# Patient Record
Sex: FEMALE | Race: WHITE | NOT HISPANIC OR LATINO | Employment: UNEMPLOYED | URBAN - METROPOLITAN AREA
[De-identification: names, ages, dates, MRNs, and addresses within clinical notes are randomized per-mention and may not be internally consistent; named-entity substitution may affect disease eponyms.]

---

## 2017-02-13 ENCOUNTER — HOSPITAL ENCOUNTER (OUTPATIENT)
Dept: RADIOLOGY | Facility: CLINIC | Age: 47
Discharge: HOME | End: 2017-02-13
Payer: COMMERCIAL

## 2017-02-13 ENCOUNTER — ALLSCRIPTS OFFICE VISIT (OUTPATIENT)
Dept: OTHER | Facility: OTHER | Age: 47
End: 2017-02-13

## 2017-02-13 DIAGNOSIS — M25.572 PAIN IN LEFT ANKLE: ICD-10-CM

## 2017-02-13 PROCEDURE — 73610 X-RAY EXAM OF ANKLE: CPT

## 2017-10-08 ENCOUNTER — APPOINTMENT (EMERGENCY)
Dept: RADIOLOGY | Facility: HOSPITAL | Age: 47
End: 2017-10-08
Payer: COMMERCIAL

## 2017-10-08 ENCOUNTER — HOSPITAL ENCOUNTER (EMERGENCY)
Facility: HOSPITAL | Age: 47
Discharge: HOME | End: 2017-10-08
Attending: EMERGENCY MEDICINE | Admitting: EMERGENCY MEDICINE
Payer: COMMERCIAL

## 2017-10-08 VITALS
WEIGHT: 193.44 LBS | HEART RATE: 76 BPM | OXYGEN SATURATION: 100 % | BODY MASS INDEX: 32.23 KG/M2 | SYSTOLIC BLOOD PRESSURE: 114 MMHG | RESPIRATION RATE: 18 BRPM | TEMPERATURE: 97.9 F | DIASTOLIC BLOOD PRESSURE: 76 MMHG | HEIGHT: 65 IN

## 2017-10-08 DIAGNOSIS — S93.602A FOOT SPRAIN, LEFT, INITIAL ENCOUNTER: ICD-10-CM

## 2017-10-08 DIAGNOSIS — S93.402A SPRAIN OF LEFT ANKLE, UNSPECIFIED LIGAMENT, INITIAL ENCOUNTER: Primary | ICD-10-CM

## 2017-10-08 PROCEDURE — 73630 X-RAY EXAM OF FOOT: CPT

## 2017-10-08 PROCEDURE — 99283 EMERGENCY DEPT VISIT LOW MDM: CPT

## 2017-10-08 PROCEDURE — 73610 X-RAY EXAM OF ANKLE: CPT

## 2017-10-08 RX ORDER — AMPICILLIN TRIHYDRATE 250 MG
600 CAPSULE ORAL 2 TIMES DAILY
COMMUNITY

## 2017-10-08 RX ORDER — IBUPROFEN 400 MG/1
400 TABLET ORAL ONCE
Status: COMPLETED | OUTPATIENT
Start: 2017-10-08 | End: 2017-10-08

## 2017-10-08 RX ORDER — LEVETIRACETAM 500 MG/1
500 TABLET ORAL EVERY 12 HOURS SCHEDULED
COMMUNITY

## 2017-10-08 RX ADMIN — IBUPROFEN 400 MG: 400 TABLET, FILM COATED ORAL at 15:20

## 2017-10-08 NOTE — DISCHARGE INSTRUCTIONS
Ankle Sprain   WHAT YOU NEED TO KNOW:   An ankle sprain happens when 1 or more ligaments in your ankle joint stretch or tear  Ligaments are tough tissues that connect bones  Ligaments support your joints and keep your bones in place  DISCHARGE INSTRUCTIONS:   Return to the emergency department if:   · You have severe pain in your ankle  · Your foot or toes are cold or numb  · Your ankle becomes more weak or unstable (wobbly)  · You are unable to put any weight on your ankle or foot  · Your swelling has increased or returned  Contact your healthcare provider if:   · Your pain does not go away, even after treatment  · You have questions or concerns about your condition or care  Medicines: You may need any of the following:  · NSAIDs , such as ibuprofen, help decrease swelling, pain, and fever  This medicine is available with or without a doctor's order  NSAIDs can cause stomach bleeding or kidney problems in certain people  If you take blood thinner medicine, always ask your healthcare provider if NSAIDs are safe for you  Always read the medicine label and follow directions  · Acetaminophen  decreases pain  It is available without a doctor's order  Ask how much to take and how often to take it  Follow directions  Acetaminophen can cause liver damage if not taken correctly  · Prescription pain medicine  may be given  Ask how to take this medicine safely  · Take your medicine as directed  Contact your healthcare provider if you think your medicine is not helping or if you have side effects  Tell him or her if you are allergic to any medicine  Keep a list of the medicines, vitamins, and herbs you take  Include the amounts, and when and why you take them  Bring the list or the pill bottles to follow-up visits  Carry your medicine list with you in case of an emergency    Self care:   · Use support devices,  such as a brace, cast, or splint, may be needed to limit your movement and protect your joint  You may need to use crutches to decrease your pain as you move around  · Go to physical therapy as directed  A physical therapist teaches you exercises to help improve movement and strength, and to decrease pain  · Rest  your ankle so that it can heal  Return to normal activities as directed  · Apply ice on your ankle for 15 to 20 minutes every hour or as directed  Use an ice pack, or put crushed ice in a plastic bag  Cover it with a towel  Ice helps prevent tissue damage and decreases swelling and pain  · Compress  your ankle  Ask if you should wrap an elastic bandage around your injured ligament  An elastic bandage provides support and helps decrease swelling and movement so your joint can heal  Wear as long as directed  · Elevate  your ankle above the level of your heart as often as you can  This will help decrease swelling and pain  Prop your ankle on pillows or blankets to keep it elevated comfortably  Prevent another ankle sprain:   · Let your ankle heal   Find out how long your ligament needs to heal  Do not do any physical activity until your healthcare provider says it is okay  If you start activity too soon, you may develop a more serious injury  · Always warm up and stretch  before you exercise or play sports  · Use the right equipment  Always wear shoes that fit well and are made for the activity that you are doing  You may also need ankle supports, elbow and knee pads, or braces  Follow up with your healthcare provider as directed:  Write down your questions so you remember to ask them during your visits  © 2017 [employer] Information is for End User's use only and may not be sold, redistributed or otherwise used for commercial purposes  All illustrations and images included in CareNotes® are the copyrighted property of [employer]  or Sanya Chester  The above information is an  only   It is not intended as medical advice for individual conditions or treatments  Talk to your doctor, nurse or pharmacist before following any medical regimen to see if it is safe and effective for you  Foot Sprain   WHAT YOU NEED TO KNOW:   A foot sprain is caused by a stretched or torn ligament in the foot or toe  Ligaments are tough tissues that connect bones  DISCHARGE INSTRUCTIONS:   Seek care immediately if:   · You have numbness or tingling below the injury, such as in your toes  · The skin on your injured foot is blue or pale  · You have increased pain, even after you take pain medicine  Contact your healthcare provider if:   · You have new weakness in your foot  · You have new or increased swelling in your foot  · You have new or increased stiffness when you move your injured foot  · You have questions or concerns about your condition or care  Medicines:   · NSAIDs , such as ibuprofen, help decrease swelling, pain, and fever  This medicine is available with or without a doctor's order  NSAIDs can cause stomach bleeding or kidney problems in certain people  If you take blood thinner medicine, always ask if NSAIDs are safe for you  Always read the medicine label and follow directions  Do not give these medicines to children under 10months of age without direction from your child's healthcare provider  · Take your medicine as directed  Contact your healthcare provider if you think your medicine is not helping or if you have side effects  Tell him of her if you are allergic to any medicine  Keep a list of the medicines, vitamins, and herbs you take  Include the amounts, and when and why you take them  Bring the list or the pill bottles to follow-up visits  Carry your medicine list with you in case of an emergency  Self-care:   · Rest your foot  Limit movement in your sprained foot for the first 2 to 3 days  You might need crutches to take weight off your injured foot as it heals  Use crutches as directed  · Apply ice  on your foot for 15 to 20 minutes every hour or as directed  Use an ice pack, or put crushed ice in a plastic bag  Cover it with a towel  Ice helps prevent tissue damage and decreases swelling and pain  · Compress your foot  You may need to use tape or an elastic bandage to support your foot if you have a mild sprain  You may need a splint on your foot for support if your sprain is severe  Wear your splint for as many days as directed  · Elevate your foot  above the level of your heart as often as you can  This will help decrease swelling and pain  Prop your foot on pillows or blankets to keep it elevated comfortably  Exercise your foot:  You may be given exercises to improve your strength and to help decrease stiffness  The exercises and physical therapy can help restore strength and increase the range of motion in your foot  Ask your healthcare provider when you can return to your normal activities or play sports  Prevent another foot sprain:   · Warm up and stretch before you exercise  · Do not exercise when you feel pain or are tired  · Wear equipment to protect yourself when you play sports  Follow up with your healthcare provider as directed:  Write down your questions so you remember to ask them during your visits  © 2017 [employer] Information is for End User's use only and may not be sold, redistributed or otherwise used for commercial purposes  All illustrations and images included in CareNotes® are the copyrighted property of [employer]  or Sanya Chester  The above information is an  only  It is not intended as medical advice for individual conditions or treatments  Talk to your doctor, nurse or pharmacist before following any medical regimen to see if it is safe and effective for you

## 2017-10-08 NOTE — ED PROVIDER NOTES
History  Chief Complaint   Patient presents with    Ankle Injury     Patient presents to the ED for evaluation of an injury to her ankle occuring 30 minutes PTA when patient "rolled" her ankle adding that she was not wearing good shoes while working outside  History of injury at this site (sprains)  Patient is a 80-year-old female who was walking outside and rolled her left ankle on uneven ground just prior to arrival   She has no other injuries or complaints  She did not hit her head there was no loss consciousness  History provided by:  Patient  Ankle Injury   Location:  Left ankle  Severity:  Severe  Onset quality:  Sudden  Duration:  1 hour  Timing:  Constant  Progression:  Unchanged  Chronicity:  New  Worsened by:  Weight bearing  Associated symptoms: nausea    Associated symptoms: no abdominal pain, no chest pain, no congestion, no cough, no diarrhea, no ear pain, no fatigue, no fever, no headaches, no loss of consciousness, no myalgias, no rash, no rhinorrhea, no shortness of breath, no sore throat, no vomiting and no wheezing        Prior to Admission Medications   Prescriptions Last Dose Informant Patient Reported? Taking? Red Yeast Rice 600 MG CAPS   Yes Yes   Sig: Take 600 mg by mouth 2 (two) times a day   levETIRAcetam (KEPPRA) 500 mg tablet   Yes Yes   Sig: Take 500 mg by mouth every 12 (twelve) hours      Facility-Administered Medications: None       Past Medical History:   Diagnosis Date    Brain abscess     Hyperlipidemia     Peripheral neuropathy        Past Surgical History:   Procedure Laterality Date    BRAIN SURGERY         History reviewed  No pertinent family history  I have reviewed and agree with the history as documented  Social History   Substance Use Topics    Smoking status: Former Smoker    Smokeless tobacco: Never Used    Alcohol use Not on file        Review of Systems   Constitutional: Negative for fatigue and fever     HENT: Negative for congestion, ear pain, rhinorrhea and sore throat  Respiratory: Negative for cough, shortness of breath and wheezing  Cardiovascular: Negative for chest pain  Gastrointestinal: Positive for nausea  Negative for abdominal pain, diarrhea and vomiting  Musculoskeletal: Positive for joint swelling  Negative for myalgias  Skin: Negative for rash  Neurological: Negative for loss of consciousness and headaches  All other systems reviewed and are negative  Physical Exam  ED Triage Vitals [10/08/17 1520]   Temperature Pulse Respirations Blood Pressure SpO2   97 9 °F (36 6 °C) 76 18 114/76 100 %      Temp src Heart Rate Source Patient Position - Orthostatic VS BP Location FiO2 (%)   -- Monitor Sitting Left arm --      Pain Score       5           Physical Exam   Constitutional: She appears well-developed and well-nourished  HENT:   Head: Normocephalic and atraumatic  Cardiovascular: Normal rate  Pulmonary/Chest: Effort normal    Musculoskeletal:        Left ankle: She exhibits decreased range of motion and swelling  She exhibits no ecchymosis, no laceration and normal pulse  Tenderness  Lateral malleolus and head of 5th metatarsal tenderness found  Feet:    Moderate swelling noted to left lateral foot at the head of the 5th Mt  Minimal ttp on the lateral malleolus  NO TTP on medial malleolus  Skin: Skin is warm and dry  Psychiatric: She has a normal mood and affect  Nursing note and vitals reviewed  ED Medications  Medications   ibuprofen (MOTRIN) tablet 400 mg (400 mg Oral Given 10/8/17 1520)       Diagnostic Studies  Labs Reviewed - No data to display    XR foot 3+ views LEFT   Final Result      No acute osseous abnormality  Workstation performed: [id number]         XR ankle 3+ views LEFT   Final Result      Mild dorsal soft tissue swelling in the mid foot region  No acute osseous abnormality           Workstation performed: [id number]             Procedures  Procedures      Phone Contacts  ED Phone Contact    ED Course  ED Course                                MDM  Number of Diagnoses or Management Options  Foot sprain, left, initial encounter: minor  Sprain of left ankle, unspecified ligament, initial encounter: minor  Diagnosis management comments: 4:05 PM  Xrays negative for acute fracture  Will place in aircast, NWB on crutches and f/u with ortho  Usually follows with Dr Huan Lopez in the [place] office  Amount and/or Complexity of Data Reviewed  Tests in the radiology section of CPT®: ordered and reviewed  Independent visualization of images, tracings, or specimens: yes    Risk of Complications, Morbidity, and/or Mortality  Presenting problems: low  Diagnostic procedures: low  Management options: low    Patient Progress  Patient progress: stable    CritCare Time    Disposition  Final diagnoses:   Sprain of left ankle, unspecified ligament, initial encounter   Foot sprain, left, initial encounter     ED Disposition     ED Disposition Condition Comment    Discharge  [place] discharge to home/self care  Condition at discharge: Stable        Follow-up Information     Follow up With Specialties Details Why Contact Leonid Mantilla MD Orthopedic Surgery Schedule an appointment as soon as possible for a visit  [address]  [phone]          Patient's Medications   Discharge Prescriptions    No medications on file     No discharge procedures on file      ED Provider  Electronically Signed by       Alejandro Spangler DO  10/08/17 6475

## 2017-10-09 ENCOUNTER — ALLSCRIPTS OFFICE VISIT (OUTPATIENT)
Dept: OTHER | Facility: OTHER | Age: 47
End: 2017-10-09

## 2017-10-09 DIAGNOSIS — S93.492A SPRAIN OF OTHER LIGAMENT OF LEFT ANKLE, INITIAL ENCOUNTER: ICD-10-CM

## 2017-10-10 NOTE — PROGRESS NOTES
Assessment  1  Family history of arthritis (V17 7) (Z82 61) : Mother   2  Sprain of anterior talofibular ligament of left ankle, initial encounter (849 09) (O15 265N)    Plan  This appears to be a grade 2 ankle sprain of the left ankle  She will wean off of her brace and also will do physical therapy  She will also wean off of her crutches  If she fails to show significant improvement over the next 6 weeks, she can return for further evaluation  She has no pain further up the proximal fibula to palpation today  Discussion/Summary  The patient, patient's family was counseled regarding diagnostic results,-instructions for management,-prognosis,-patient and family education,-impressions,-risks and benefits of treatment options,-importance of compliance with treatment  Chief Complaint  1  Ankle Pain    History of Present Illness  Sola Lion is a 70-year-old female who [place] and inverted her left ankle on some uneven footing  She suffered an injury to that ankle and had significant swelling and pain  Emergency department took x-rays that demonstrate no obvious signs of fracture  She was placed into an Aircast stirrup  This has helped her with her pain  She was placed on crutches  She states that her pain is a sharp and moderate and constant discomfort that is worse with weight-bearing and better with rest   The pain radiates anterolaterally  Review of Systems    Constitutional: No fever, no chills, feels well, no tiredness, no recent weight gain or loss  Eyes: No complaints of eyesight problems, no red eyes  ENT: no loss of hearing, no nosebleeds, no sore throat  Cardiovascular: No complaints of chest pain, no palpitations, no leg claudication or lower extremity edema  Respiratory: no compliants of shortness of breath, no wheezing, no cough  Gastrointestinal: no complaints of abdominal pain, no constipation, no nausea or diarrhea, no vomiting, no bloody stools     Genitourinary: no complaints of dysuria, no incontinence  Musculoskeletal: as noted in HPI  Integumentary: no complaints of skin rash or lesion, no itching or dry skin, no skin wounds  Neurological: no complaints of headache, no confusion, no numbness or tingling, no dizziness  Endocrine: No complaints of muscle weakness, no feelings of weakness, no frequent urination, no excessive thirst    Psychiatric: No suicidal thoughts, no anxiety, no feelings of depression  Active Problems  1  Left ankle pain (719 47) (M25 572)    Past Medical History   · History of Left ankle pain (719 47) (M25 572)    The active problems and past medical history were reviewed and updated today  Surgical History    The surgical history was reviewed and updated today  Family History  Mother    · Family history of arthritis (V17 7) (Z82 61)   · Family history of malignant neoplasm (V16 9) (Z80 9)    The family history was reviewed and updated today  Social History   · Never a smoker  The social history was reviewed and updated today  Current Meds    The medication list was reviewed and updated today  Allergies  1  Sulfa Drugs    Vitals  Signs   Heart Rate: 62  Systolic: 619  Diastolic: 80  Height: 5 ft 5 in  Weight: 191 lb   BMI Calculated: 31 78  BSA Calculated: 1 94    Physical Exam    Left Ankle: Appearance: Normal except swelling laterally  Tenderness: None except the ATFL-and-CFL  ROM: painful limited ROM in all planes Dorsiflexion was 5/5  Plantar flexion was 5/5  Inversion was 5/5  Eversion was 3/5  Special Tests: negative Anterior Drawer Sign,-no pain with passive ER,-no peroneal tendon subluxation,-no posterior tibialis tendon subluxation-and-negative Squeeze Test    Constitutional - General appearance: Normal    Musculoskeletal - Gait and station: Abnormal  Gait evaluation demonstrated antalgia on the left  -Digits and nails: Normal -Muscle strength/tone: Normal -Lower extremity compartments: Normal  Cardiovascular - Pulses: Normal -Examination of extremities for edema and/or varicosities: Normal    Skin - Skin and subcutaneous tissue: Normal    Neurologic - Sensation: Normal -Lower extremity peripheral neuro exam: Normal    Psychiatric - Orientation to person, place, and time: Normal -Mood and affect: Normal    Eyes   Conjunctiva and lids: Normal     Pupils and irises: Normal        Results/Data  I personally reviewed the films/images/results in the office today  My interpretation follows  X-ray Review Negative left ankle x-rays        Signatures   Electronically signed by : JACKIE Green ; Oct  9 2017 12:33PM EST                       (Author)

## 2017-10-25 ENCOUNTER — APPOINTMENT (OUTPATIENT)
Dept: PHYSICAL THERAPY | Facility: CLINIC | Age: 47
End: 2017-10-25
Payer: COMMERCIAL

## 2017-10-25 DIAGNOSIS — S93.492A SPRAIN OF OTHER LIGAMENT OF LEFT ANKLE, INITIAL ENCOUNTER: ICD-10-CM

## 2017-10-25 PROCEDURE — G8979 MOBILITY GOAL STATUS: HCPCS

## 2017-10-25 PROCEDURE — G8978 MOBILITY CURRENT STATUS: HCPCS

## 2017-10-25 PROCEDURE — 97161 PT EVAL LOW COMPLEX 20 MIN: CPT

## 2017-10-27 ENCOUNTER — APPOINTMENT (OUTPATIENT)
Dept: PHYSICAL THERAPY | Facility: CLINIC | Age: 47
End: 2017-10-27
Payer: COMMERCIAL

## 2017-10-27 PROCEDURE — 97110 THERAPEUTIC EXERCISES: CPT

## 2017-10-27 PROCEDURE — 97140 MANUAL THERAPY 1/> REGIONS: CPT

## 2017-10-31 ENCOUNTER — APPOINTMENT (OUTPATIENT)
Dept: PHYSICAL THERAPY | Facility: CLINIC | Age: 47
End: 2017-10-31
Payer: COMMERCIAL

## 2017-10-31 PROCEDURE — 97140 MANUAL THERAPY 1/> REGIONS: CPT

## 2017-10-31 PROCEDURE — 97112 NEUROMUSCULAR REEDUCATION: CPT

## 2017-10-31 PROCEDURE — 97110 THERAPEUTIC EXERCISES: CPT

## 2017-11-01 ENCOUNTER — GENERIC CONVERSION - ENCOUNTER (OUTPATIENT)
Dept: OTHER | Facility: OTHER | Age: 47
End: 2017-11-01

## 2017-11-03 ENCOUNTER — APPOINTMENT (OUTPATIENT)
Dept: PHYSICAL THERAPY | Facility: CLINIC | Age: 47
End: 2017-11-03
Payer: COMMERCIAL

## 2017-11-03 PROCEDURE — 97140 MANUAL THERAPY 1/> REGIONS: CPT

## 2017-11-03 PROCEDURE — 97110 THERAPEUTIC EXERCISES: CPT

## 2017-11-08 ENCOUNTER — APPOINTMENT (OUTPATIENT)
Dept: PHYSICAL THERAPY | Facility: CLINIC | Age: 47
End: 2017-11-08
Payer: COMMERCIAL

## 2017-11-08 PROCEDURE — 97140 MANUAL THERAPY 1/> REGIONS: CPT

## 2017-11-08 PROCEDURE — 97110 THERAPEUTIC EXERCISES: CPT

## 2017-11-10 ENCOUNTER — APPOINTMENT (OUTPATIENT)
Dept: PHYSICAL THERAPY | Facility: CLINIC | Age: 47
End: 2017-11-10
Payer: COMMERCIAL

## 2017-11-10 PROCEDURE — 97110 THERAPEUTIC EXERCISES: CPT

## 2017-11-10 PROCEDURE — 97112 NEUROMUSCULAR REEDUCATION: CPT

## 2017-11-10 PROCEDURE — 97140 MANUAL THERAPY 1/> REGIONS: CPT

## 2017-11-15 ENCOUNTER — APPOINTMENT (OUTPATIENT)
Dept: PHYSICAL THERAPY | Facility: CLINIC | Age: 47
End: 2017-11-15
Payer: COMMERCIAL

## 2017-11-15 PROCEDURE — 97140 MANUAL THERAPY 1/> REGIONS: CPT

## 2017-11-15 PROCEDURE — 97110 THERAPEUTIC EXERCISES: CPT

## 2017-11-15 PROCEDURE — 97112 NEUROMUSCULAR REEDUCATION: CPT

## 2017-11-17 ENCOUNTER — APPOINTMENT (OUTPATIENT)
Dept: PHYSICAL THERAPY | Facility: CLINIC | Age: 47
End: 2017-11-17
Payer: COMMERCIAL

## 2017-11-17 PROCEDURE — 97110 THERAPEUTIC EXERCISES: CPT

## 2017-11-17 PROCEDURE — 97140 MANUAL THERAPY 1/> REGIONS: CPT

## 2017-11-17 PROCEDURE — 97112 NEUROMUSCULAR REEDUCATION: CPT

## 2017-11-22 ENCOUNTER — APPOINTMENT (OUTPATIENT)
Dept: PHYSICAL THERAPY | Facility: CLINIC | Age: 47
End: 2017-11-22
Payer: COMMERCIAL

## 2017-11-22 PROCEDURE — 97110 THERAPEUTIC EXERCISES: CPT

## 2017-11-22 PROCEDURE — 97112 NEUROMUSCULAR REEDUCATION: CPT

## 2017-11-29 ENCOUNTER — APPOINTMENT (OUTPATIENT)
Dept: PHYSICAL THERAPY | Facility: CLINIC | Age: 47
End: 2017-11-29
Payer: COMMERCIAL

## 2017-12-01 ENCOUNTER — GENERIC CONVERSION - ENCOUNTER (OUTPATIENT)
Dept: OBGYN CLINIC | Facility: CLINIC | Age: 47
End: 2017-12-01

## 2017-12-01 ENCOUNTER — APPOINTMENT (OUTPATIENT)
Dept: PHYSICAL THERAPY | Facility: CLINIC | Age: 47
End: 2017-12-01
Payer: COMMERCIAL

## 2017-12-01 PROCEDURE — G8979 MOBILITY GOAL STATUS: HCPCS

## 2017-12-01 PROCEDURE — G8978 MOBILITY CURRENT STATUS: HCPCS

## 2017-12-01 PROCEDURE — 97110 THERAPEUTIC EXERCISES: CPT

## 2017-12-04 ENCOUNTER — APPOINTMENT (OUTPATIENT)
Dept: PHYSICAL THERAPY | Facility: CLINIC | Age: 47
End: 2017-12-04
Payer: COMMERCIAL

## 2017-12-04 PROCEDURE — 97110 THERAPEUTIC EXERCISES: CPT

## 2017-12-04 PROCEDURE — 97112 NEUROMUSCULAR REEDUCATION: CPT

## 2017-12-06 ENCOUNTER — APPOINTMENT (OUTPATIENT)
Dept: PHYSICAL THERAPY | Facility: CLINIC | Age: 47
End: 2017-12-06
Payer: COMMERCIAL

## 2017-12-08 ENCOUNTER — APPOINTMENT (OUTPATIENT)
Dept: PHYSICAL THERAPY | Facility: CLINIC | Age: 47
End: 2017-12-08
Payer: COMMERCIAL

## 2017-12-08 PROCEDURE — 97110 THERAPEUTIC EXERCISES: CPT

## 2017-12-08 PROCEDURE — 97112 NEUROMUSCULAR REEDUCATION: CPT

## 2017-12-13 ENCOUNTER — APPOINTMENT (OUTPATIENT)
Dept: PHYSICAL THERAPY | Facility: CLINIC | Age: 47
End: 2017-12-13
Payer: COMMERCIAL

## 2017-12-13 PROCEDURE — 97110 THERAPEUTIC EXERCISES: CPT

## 2017-12-13 PROCEDURE — 97112 NEUROMUSCULAR REEDUCATION: CPT

## 2017-12-15 ENCOUNTER — APPOINTMENT (OUTPATIENT)
Dept: PHYSICAL THERAPY | Facility: CLINIC | Age: 47
End: 2017-12-15
Payer: COMMERCIAL

## 2017-12-15 PROCEDURE — 97110 THERAPEUTIC EXERCISES: CPT

## 2017-12-15 PROCEDURE — 97112 NEUROMUSCULAR REEDUCATION: CPT

## 2018-01-13 VITALS
HEART RATE: 73 BPM | SYSTOLIC BLOOD PRESSURE: 125 MMHG | DIASTOLIC BLOOD PRESSURE: 91 MMHG | HEIGHT: 65 IN | WEIGHT: 138 LBS | BODY MASS INDEX: 22.99 KG/M2

## 2018-01-15 VITALS
SYSTOLIC BLOOD PRESSURE: 119 MMHG | WEIGHT: 191 LBS | DIASTOLIC BLOOD PRESSURE: 80 MMHG | HEART RATE: 62 BPM | HEIGHT: 65 IN | BODY MASS INDEX: 31.82 KG/M2

## 2024-02-06 ENCOUNTER — APPOINTMENT (EMERGENCY)
Dept: CT IMAGING | Facility: HOSPITAL | Age: 54
End: 2024-02-06
Payer: COMMERCIAL

## 2024-02-06 ENCOUNTER — APPOINTMENT (EMERGENCY)
Dept: RADIOLOGY | Facility: HOSPITAL | Age: 54
End: 2024-02-06
Payer: COMMERCIAL

## 2024-02-06 ENCOUNTER — HOSPITAL ENCOUNTER (EMERGENCY)
Facility: HOSPITAL | Age: 54
Discharge: HOME | End: 2024-02-06
Attending: EMERGENCY MEDICINE
Payer: COMMERCIAL

## 2024-02-06 VITALS
DIASTOLIC BLOOD PRESSURE: 57 MMHG | TEMPERATURE: 97.9 F | SYSTOLIC BLOOD PRESSURE: 112 MMHG | RESPIRATION RATE: 20 BRPM | OXYGEN SATURATION: 100 % | HEART RATE: 87 BPM

## 2024-02-06 DIAGNOSIS — R55 SYNCOPE: ICD-10-CM

## 2024-02-06 DIAGNOSIS — K52.9 GASTROENTERITIS: Primary | ICD-10-CM

## 2024-02-06 LAB
ALBUMIN SERPL BCP-MCNC: 4.3 G/DL (ref 3.5–5)
ALP SERPL-CCNC: 64 U/L (ref 34–104)
ALT SERPL W P-5'-P-CCNC: 9 U/L (ref 7–52)
ANION GAP SERPL CALCULATED.3IONS-SCNC: 9 MMOL/L
ANISOCYTOSIS BLD QL SMEAR: PRESENT
AST SERPL W P-5'-P-CCNC: 13 U/L (ref 13–39)
BASOPHILS # BLD MANUAL: 0 THOUSAND/UL (ref 0–0.1)
BASOPHILS NFR MAR MANUAL: 0 % (ref 0–1)
BILIRUB SERPL-MCNC: 1.37 MG/DL (ref 0.2–1)
BUN SERPL-MCNC: 18 MG/DL (ref 5–25)
CALCIUM SERPL-MCNC: 8.9 MG/DL (ref 8.4–10.2)
CARDIAC TROPONIN I PNL SERPL HS: <2 NG/L
CARDIAC TROPONIN I PNL SERPL HS: <2 NG/L
CHLORIDE SERPL-SCNC: 101 MMOL/L (ref 96–108)
CO2 SERPL-SCNC: 27 MMOL/L (ref 21–32)
CREAT SERPL-MCNC: 0.92 MG/DL (ref 0.6–1.3)
EOSINOPHIL # BLD MANUAL: 0 THOUSAND/UL (ref 0–0.4)
EOSINOPHIL NFR BLD MANUAL: 0 % (ref 0–6)
ERYTHROCYTE [DISTWIDTH] IN BLOOD BY AUTOMATED COUNT: 12.5 % (ref 11.6–15.1)
FLUAV RNA RESP QL NAA+PROBE: NEGATIVE
FLUBV RNA RESP QL NAA+PROBE: NEGATIVE
GFR SERPL CREATININE-BSD FRML MDRD: 71 ML/MIN/1.73SQ M
GLUCOSE SERPL-MCNC: 138 MG/DL (ref 65–140)
HCT VFR BLD AUTO: 44.8 % (ref 34.8–46.1)
HGB BLD-MCNC: 14.9 G/DL (ref 11.5–15.4)
LIPASE SERPL-CCNC: 18 U/L (ref 11–82)
LYMPHOCYTES # BLD AUTO: 0.39 THOUSAND/UL (ref 0.6–4.47)
LYMPHOCYTES # BLD AUTO: 5 % (ref 14–44)
MCH RBC QN AUTO: 28.4 PG (ref 26.8–34.3)
MCHC RBC AUTO-ENTMCNC: 33.3 G/DL (ref 31.4–37.4)
MCV RBC AUTO: 85 FL (ref 82–98)
MONOCYTES # BLD AUTO: 0 THOUSAND/UL (ref 0–1.22)
MONOCYTES NFR BLD: 0 % (ref 4–12)
NEUTROPHILS # BLD MANUAL: 7.32 THOUSAND/UL (ref 1.85–7.62)
NEUTS SEG NFR BLD AUTO: 95 % (ref 43–75)
PLATELET # BLD AUTO: 178 THOUSANDS/UL (ref 149–390)
PLATELET BLD QL SMEAR: ADEQUATE
PMV BLD AUTO: 10.2 FL (ref 8.9–12.7)
POIKILOCYTOSIS BLD QL SMEAR: PRESENT
POTASSIUM SERPL-SCNC: 4.1 MMOL/L (ref 3.5–5.3)
PROT SERPL-MCNC: 6.5 G/DL (ref 6.4–8.4)
RBC # BLD AUTO: 5.25 MILLION/UL (ref 3.81–5.12)
RBC MORPH BLD: PRESENT
RSV RNA RESP QL NAA+PROBE: NEGATIVE
SARS-COV-2 RNA RESP QL NAA+PROBE: NEGATIVE
SODIUM SERPL-SCNC: 137 MMOL/L (ref 135–147)
TSH SERPL DL<=0.05 MIU/L-ACNC: 1.02 UIU/ML (ref 0.45–4.5)
WBC # BLD AUTO: 7.7 THOUSAND/UL (ref 4.31–10.16)

## 2024-02-06 PROCEDURE — 84484 ASSAY OF TROPONIN QUANT: CPT

## 2024-02-06 PROCEDURE — 96366 THER/PROPH/DIAG IV INF ADDON: CPT

## 2024-02-06 PROCEDURE — 85027 COMPLETE CBC AUTOMATED: CPT

## 2024-02-06 PROCEDURE — 71045 X-RAY EXAM CHEST 1 VIEW: CPT

## 2024-02-06 PROCEDURE — 80053 COMPREHEN METABOLIC PANEL: CPT

## 2024-02-06 PROCEDURE — 99284 EMERGENCY DEPT VISIT MOD MDM: CPT

## 2024-02-06 PROCEDURE — 70450 CT HEAD/BRAIN W/O DYE: CPT

## 2024-02-06 PROCEDURE — 99285 EMERGENCY DEPT VISIT HI MDM: CPT | Performed by: EMERGENCY MEDICINE

## 2024-02-06 PROCEDURE — 85007 BL SMEAR W/DIFF WBC COUNT: CPT

## 2024-02-06 PROCEDURE — 96375 TX/PRO/DX INJ NEW DRUG ADDON: CPT

## 2024-02-06 PROCEDURE — 36415 COLL VENOUS BLD VENIPUNCTURE: CPT

## 2024-02-06 PROCEDURE — 0241U HB NFCT DS VIR RESP RNA 4 TRGT: CPT

## 2024-02-06 PROCEDURE — 93005 ELECTROCARDIOGRAM TRACING: CPT

## 2024-02-06 PROCEDURE — 96365 THER/PROPH/DIAG IV INF INIT: CPT

## 2024-02-06 PROCEDURE — 84443 ASSAY THYROID STIM HORMONE: CPT

## 2024-02-06 PROCEDURE — 83690 ASSAY OF LIPASE: CPT

## 2024-02-06 PROCEDURE — G1004 CDSM NDSC: HCPCS

## 2024-02-06 RX ORDER — KETOROLAC TROMETHAMINE 30 MG/ML
15 INJECTION, SOLUTION INTRAMUSCULAR; INTRAVENOUS ONCE
Status: COMPLETED | OUTPATIENT
Start: 2024-02-06 | End: 2024-02-06

## 2024-02-06 RX ORDER — ROSUVASTATIN CALCIUM 5 MG/1
5 TABLET, COATED ORAL DAILY
COMMUNITY

## 2024-02-06 RX ORDER — ONDANSETRON 4 MG/1
4 TABLET, FILM COATED ORAL EVERY 6 HOURS
Qty: 12 TABLET | Refills: 0 | Status: SHIPPED | OUTPATIENT
Start: 2024-02-06

## 2024-02-06 RX ORDER — ONDANSETRON 2 MG/ML
4 INJECTION INTRAMUSCULAR; INTRAVENOUS ONCE
Status: COMPLETED | OUTPATIENT
Start: 2024-02-06 | End: 2024-02-06

## 2024-02-06 RX ADMIN — KETOROLAC TROMETHAMINE 15 MG: 30 INJECTION, SOLUTION INTRAMUSCULAR; INTRAVENOUS at 04:12

## 2024-02-06 RX ADMIN — ONDANSETRON 4 MG: 2 INJECTION INTRAMUSCULAR; INTRAVENOUS at 04:12

## 2024-02-06 RX ADMIN — SODIUM CHLORIDE, SODIUM LACTATE, POTASSIUM CHLORIDE, AND CALCIUM CHLORIDE 1000 ML: .6; .31; .03; .02 INJECTION, SOLUTION INTRAVENOUS at 01:45

## 2024-02-06 NOTE — ED PROVIDER NOTES
History  Chief Complaint   Patient presents with    Vomiting     Nausea/vomiting/fever. Was on the toilet and synopsized, fell onto floor and hit her head. She is not on any blood thinners.      Rachel is a 54 yof who presents after syncopal episode with headstrike.  Two days ago, had onset of nasal congestion but without accompanying symptoms.  Felt nauseated on waking this morning, developed fever, chills, myalgias, and had numerous episodes of non-bloody, non-bilious vomiting this evening, accompanied by occasional watery, loose, non-bloody stools.  Was on the toilet this evening having a large bowel movement, also felt as though she was going to vomit at that time, felt lightheaded and though she was going to pass out but denies chest pain, palpitations, or dyspnea at that time.  Had syncopal episode, struck head on bathroom floor, LOC only lasted several seconds and she quickly returned to baseline per . Has mild occipital headache   Denies vision changes, numbness, weakness, chest pain, palpitations, dyspnea, abdominal pain, peripheral edema, or unilateral calf pain or swelling.  Feels mildly generally weak but denies focal weakness, also feels lightheadedness has completely resolved.  No known travel or sick contacts.        Prior to Admission Medications   Prescriptions Last Dose Informant Patient Reported? Taking?   Red Yeast Rice 600 MG CAPS   Yes No   Sig: Take 600 mg by mouth 2 (two) times a day   levETIRAcetam (KEPPRA) 500 mg tablet   Yes No   Sig: Take 500 mg by mouth every 12 (twelve) hours   rosuvastatin (CRESTOR) 5 mg tablet   Yes Yes   Sig: Take 5 mg by mouth daily      Facility-Administered Medications: None       Past Medical History:   Diagnosis Date    Brain abscess     Hyperlipidemia     Peripheral neuropathy        Past Surgical History:   Procedure Laterality Date    BRAIN SURGERY         History reviewed. No pertinent family history.  I have reviewed and agree with the history as  documented.    E-Cigarette/Vaping     E-Cigarette/Vaping Substances     Social History     Tobacco Use    Smoking status: Former    Smokeless tobacco: Never   Substance Use Topics    Drug use: No        Review of Systems   Constitutional:  Positive for chills, fatigue and fever. Negative for diaphoresis.   HENT:  Positive for congestion. Negative for rhinorrhea, sneezing, sore throat and trouble swallowing.    Eyes: Negative.  Negative for visual disturbance.   Respiratory: Negative.  Negative for cough and shortness of breath.    Cardiovascular: Negative.  Negative for chest pain, palpitations and leg swelling.   Gastrointestinal:  Positive for diarrhea, nausea and vomiting. Negative for abdominal distention, abdominal pain and blood in stool.   Endocrine: Negative.    Genitourinary: Negative.  Negative for difficulty urinating, dysuria, flank pain and hematuria.   Musculoskeletal:  Negative for back pain and neck pain.   Skin: Negative.  Negative for wound.   Allergic/Immunologic: Negative.    Neurological:  Positive for syncope and weakness. Negative for dizziness, light-headedness, numbness and headaches.   Hematological: Negative.  Does not bruise/bleed easily.   Psychiatric/Behavioral: Negative.  Negative for confusion.    All other systems reviewed and are negative.      Physical Exam  ED Triage Vitals [02/06/24 0113]   Temperature Pulse Respirations Blood Pressure SpO2   97.9 °F (36.6 °C) 87 20 112/57 100 %      Temp Source Heart Rate Source Patient Position - Orthostatic VS BP Location FiO2 (%)   Oral Monitor Sitting Right arm --      Pain Score       --             Orthostatic Vital Signs  Vitals:    02/06/24 0113   BP: 112/57   Pulse: 87   Patient Position - Orthostatic VS: Sitting       Physical Exam  Vitals and nursing note reviewed. Exam conducted with a chaperone present.   Constitutional:       General: She is not in acute distress.     Appearance: Normal appearance. She is not diaphoretic.   HENT:       Head: Normocephalic and atraumatic.      Right Ear: Tympanic membrane and external ear normal.      Left Ear: Tympanic membrane and external ear normal.      Nose: Congestion present. No rhinorrhea.      Mouth/Throat:      Mouth: Mucous membranes are moist.      Pharynx: Oropharynx is clear. No oropharyngeal exudate or posterior oropharyngeal erythema.   Eyes:      General: No scleral icterus.        Right eye: No discharge.         Left eye: No discharge.      Extraocular Movements: Extraocular movements intact.      Conjunctiva/sclera: Conjunctivae normal.      Pupils: Pupils are equal, round, and reactive to light.   Cardiovascular:      Rate and Rhythm: Normal rate and regular rhythm.      Pulses: Normal pulses.      Heart sounds: Normal heart sounds.   Pulmonary:      Effort: Pulmonary effort is normal. No respiratory distress.      Breath sounds: Normal breath sounds. No wheezing or rales.   Chest:      Chest wall: No tenderness.   Abdominal:      General: Abdomen is flat. Bowel sounds are normal. There is no distension.      Palpations: Abdomen is soft.      Tenderness: There is no abdominal tenderness. There is no right CVA tenderness, left CVA tenderness, guarding or rebound.   Musculoskeletal:         General: No swelling or tenderness. Normal range of motion.      Cervical back: Normal range of motion and neck supple. No tenderness.      Right lower leg: No edema.      Left lower leg: No edema.   Lymphadenopathy:      Cervical: No cervical adenopathy.   Skin:     General: Skin is warm and dry.      Capillary Refill: Capillary refill takes less than 2 seconds.      Coloration: Skin is pale.      Findings: No bruising or rash.   Neurological:      General: No focal deficit present.      Mental Status: She is alert and oriented to person, place, and time.      Sensory: No sensory deficit.      Motor: No weakness.   Psychiatric:         Mood and Affect: Mood normal.         Behavior: Behavior normal.          ED Medications  Medications   lactated ringers bolus 1,000 mL (0 mL Intravenous Stopped 2/6/24 0412)   ketorolac (TORADOL) injection 15 mg (15 mg Intravenous Given 2/6/24 0412)   ondansetron (ZOFRAN) injection 4 mg (4 mg Intravenous Given 2/6/24 0412)       Diagnostic Studies  Results Reviewed       Procedure Component Value Units Date/Time    HS Troponin I 2hr [[id number]] Collected: 02/06/24 0504    Lab Status: Final result Specimen: Blood from Arm, Left Updated: 02/06/24 0539     hs TnI 2hr <2 ng/L      Delta 2hr hsTnI --    RBC Morphology Reflex Test [[id number]] Collected: 02/06/24 0144    Lab Status: Final result Specimen: Blood from Arm, Left Updated: 02/06/24 0301    CBC and differential [[id number]]  (Abnormal) Collected: 02/06/24 0144    Lab Status: Final result Specimen: Blood from Arm, Left Updated: 02/06/24 0241     WBC 7.70 Thousand/uL      RBC 5.25 Million/uL      Hemoglobin 14.9 g/dL      Hematocrit 44.8 %      MCV 85 fL      MCH 28.4 pg      MCHC 33.3 g/dL      RDW 12.5 %      MPV 10.2 fL      Platelets 178 Thousands/uL     Narrative:      This is an appended report.  These results have been appended to a previously verified report.    Manual Differential(PHLEBS Do Not Order) [[id number]]  (Abnormal) Collected: 02/06/24 0144    Lab Status: Final result Specimen: Blood from Arm, Left Updated: 02/06/24 0241     Segmented % 95 %      Lymphocytes % 5 %      Monocytes % 0 %      Eosinophils, % 0 %      Basophils % 0 %      Absolute Neutrophils 7.32 Thousand/uL      Lymphocytes Absolute 0.39 Thousand/uL      Monocytes Absolute 0.00 Thousand/uL      Eosinophils Absolute 0.00 Thousand/uL      Basophils Absolute 0.00 Thousand/uL      Total Counted --     RBC Morphology Present     Platelet Estimate Adequate     Anisocytosis Present     Poikilocytes Present    FLU/RSV/COVID - if FLU/RSV clinically relevant [[id number]]  (Normal) Collected: 02/06/24 0144    Lab Status: Final result Specimen: Nares from Nose  Updated: 02/06/24 0229     SARS-CoV-2 Negative     INFLUENZA A PCR Negative     INFLUENZA B PCR Negative     RSV PCR Negative    Narrative:      FOR PEDIATRIC PATIENTS - copy/paste COVID Guidelines URL to browser: [email or web address]    SARS-CoV-2 assay is a Nucleic Acid Amplification assay intended for the  qualitative detection of nucleic acid from SARS-CoV-2 in nasopharyngeal  swabs. Results are for the presumptive identification of SARS-CoV-2 RNA.    Positive results are indicative of infection with SARS-CoV-2, the virus  causing COVID-19, but do not rule out bacterial infection or co-infection  with other viruses. Laboratories within the [place] and its  territories are required to report all positive results to the appropriate  public health authorities. Negative results do not preclude SARS-CoV-2  infection and should not be used as the sole basis for treatment or other  patient management decisions. Negative results must be combined with  clinical observations, patient history, and epidemiological information.  This test has not been FDA cleared or approved.    This test has been authorized by FDA under an Emergency Use Authorization  (EUA). This test is only authorized for the duration of time the  declaration that circumstances exist justifying the authorization of the  emergency use of an in vitro diagnostic tests for detection of SARS-CoV-2  virus and/or diagnosis of COVID-19 infection under section 564(b)(1) of  the Act, 21 U.S.C. 360bbb-3(b)(1), unless the authorization is terminated  or revoked sooner. The test has been validated but independent review by FDA  and CLIA is pending.    Test performed using [employer]: This RT-PCR assay targets N2,  a region unique to SARS-CoV-2. A conserved region in the E-gene was chosen  for pan-Sarbecovirus detection which includes SARS-CoV-2.    According to CMS-2020-01-R, this platform meets the  definition of high-throughput technology.    TSH, 3rd generation with Free T4 reflex [[id number]]  (Normal) Collected: 02/06/24 0144    Lab Status: Final result Specimen: Blood from Arm, Left Updated: 02/06/24 0224     TSH 3RD GENERATON 1.023 uIU/mL     HS Troponin 0hr (reflex protocol) [[id number]]  (Normal) Collected: 02/06/24 0144    Lab Status: Final result Specimen: Blood from Arm, Left Updated: 02/06/24 0216     hs TnI 0hr <2 ng/L     Comprehensive metabolic panel [[id number]]  (Abnormal) Collected: 02/06/24 0144    Lab Status: Final result Specimen: Blood from Arm, Left Updated: 02/06/24 0213     Sodium 137 mmol/L      Potassium 4.1 mmol/L      Chloride 101 mmol/L      CO2 27 mmol/L      ANION GAP 9 mmol/L      BUN 18 mg/dL      Creatinine 0.92 mg/dL      Glucose 138 mg/dL      Calcium 8.9 mg/dL      AST 13 U/L      ALT 9 U/L      Alkaline Phosphatase 64 U/L      Total Protein 6.5 g/dL      Albumin 4.3 g/dL      Total Bilirubin 1.37 mg/dL      eGFR 71 ml/min/1.73sq m     Narrative:      National Kidney Disease Foundation guidelines for Chronic Kidney Disease (CKD):     Stage 1 with normal or high GFR (GFR > 90 mL/min/1.73 square meters)    Stage 2 Mild CKD (GFR = 60-89 mL/min/1.73 square meters)    Stage 3A Moderate CKD (GFR = 45-59 mL/min/1.73 square meters)    Stage 3B Moderate CKD (GFR = 30-44 mL/min/1.73 square meters)    Stage 4 Severe CKD (GFR = 15-29 mL/min/1.73 square meters)    Stage 5 End Stage CKD (GFR <15 mL/min/1.73 square meters)  Note: GFR calculation is accurate only with a steady state creatinine    Lipase [[id number]]  (Normal) Collected: 02/06/24 0144    Lab Status: Final result Specimen: Blood from Arm, Left Updated: 02/06/24 0213     Lipase 18 u/L                    XR chest 1 view portable   ED Interpretation by Krissy Stanley DO (02/06 0682)   No obvious acute cardiopulmonary findings as per my independent interpretation.      Final Result by Venkatesh Munoz DO (02/06 9623)       No acute cardiopulmonary disease.               Resident: DOMINGA HEDRICK I, the attending radiologist, have reviewed the images and agree with the final report above.      Workstation performed: [id number]         CT head without contrast   Final Result by Evgeny Cortez DO (02/06 0250)      No acute intracranial abnormality.                  Workstation performed: [id number]               Procedures  ECG 12 Lead Documentation Only    Date/Time: 2/6/2024 2:37 AM    Performed by: Krissy Stanley DO  Authorized by: Krissy Stanley DO    Indications / Diagnosis:  Syncope  Patient location:  ED  Previous ECG:     Previous ECG:  Unavailable  Interpretation:     Interpretation: normal    Rate:     ECG rate:  86    ECG rate assessment: normal    Rhythm:     Rhythm: sinus rhythm    Ectopy:     Ectopy: none    QRS:     QRS axis:  Normal    QRS intervals:  Normal  Conduction:     Conduction: normal    ST segments:     ST segments:  Normal  T waves:     T waves: normal          ED Course  ED Course as of 02/10/24 2335   Tue Feb 06, 2024   0155 CBC and differential(!)  Hemoglobin and RBCs increased from prior, likely 2/2 dehydration.   0328 CT head without contrast  IMPRESSION:     No acute intracranial abnormality.     0342 Comprehensive metabolic panel(!)  Normal with exception to mild increased bilirubin.   0343 Lipase: 18  Normal, doubt pancreatitis.   0343 hs TnI 0hr: <2  Will obtain 2 hour repeat.  Heart score: 0   0343 FLU/RSV/COVID - if FLU/RSV clinically relevant  Negative.   0412 CT head without contrast   0412 XR chest 1 view portable  No obvious acute cardiopulmonary findings as per my independent interpretation.   0431 No further episodes of vomiting.  HA improved with toradol.   Will attempt PO challenge.  Pending 2 hour repeat trop.     0612 hs TnI 2hr: <2  Normal.   0616 Patient feeling much improved after medications and IV fluids, is tolerating PO. After discussion, will discharge  with rx for zofran, close PCP f/u.             HEART Risk Score      Flowsheet Row Most Recent Value   Heart Score Risk Calculator    History 0 Filed at: 02/06/2024 0344   ECG 0 Filed at: 02/06/2024 0344   Age 1 Filed at: 02/06/2024 0344   Risk Factors 1 Filed at: 02/06/2024 0344   Troponin 0 Filed at: 02/06/2024 0344   HEART Score 2 Filed at: 02/06/2024 0344                        SBIRT 22yo+      Flowsheet Row Most Recent Value   Initial Alcohol Screen: US AUDIT-C     1. How often do you have a drink containing alcohol? 0 Filed at: 02/06/2024 0112   2. How many drinks containing alcohol do you have on a typical day you are drinking?  0 Filed at: 02/06/2024 0112   3b. FEMALE Any Age, or MALE 65+: How often do you have 4 or more drinks on one occassion? 0 Filed at: 02/06/2024 0112   Audit-C Score 0 Filed at: 02/06/2024 0112   AMAIRANI: How many times in the past year have you...    Used an illegal drug or used a prescription medication for non-medical reasons? Never Filed at: 02/06/2024 0112                  Medical Decision Making  Pt presents after syncopal episode precipitated by episode of vomiting and profuse watery diarrhea.  Struck head, denies any injuries from fall.    VS WNL, physical exam as above.  Likely gastroenteritis with vasovagal syncope, however will obtain labs to evaluate for anemia, electrolyte abnormality, ROSS, elevated troponin, arrhythmia.  Will also obtain head CT given head strike and continued vomiting, however this is more likely 2/2 her viral illness.  No abdominal pain to suggest significant intra-abdominal infection.  No dysuria to suggest UTI.  See ED course for remainder of MDM.    Problems Addressed:  Gastroenteritis: acute illness or injury  Syncope: acute illness or injury    Amount and/or Complexity of Data Reviewed  Labs: ordered. Decision-making details documented in ED Course.  Radiology: ordered and independent interpretation performed. Decision-making details documented in ED  Course.  ECG/medicine tests: ordered and independent interpretation performed.    Risk  Prescription drug management.          Disposition  Final diagnoses:   Gastroenteritis   Syncope     Time reflects when diagnosis was documented in both MDM as applicable and the Disposition within this note       Time User Action Codes Description Comment    2/6/2024  6:17 AM Krissy Stanley [K52.9] Gastroenteritis     2/6/2024  6:17 AM Krissy Stanley [[id number]] Syncope           ED Disposition       ED Disposition   Discharge    Condition   Stable    Date/Time   Tue Feb 6, 2024 0618    Comment   Rachel Larson discharge to home/self care.                   Follow-up Information       Follow up With Specialties Details Why Contact Info    Miguelangel Baron MD Family Medicine   [address]  [place] [id number]  [phone]              Discharge Medication List as of 2/6/2024  6:18 AM        START taking these medications    Details   ondansetron (ZOFRAN) 4 mg tablet Take 1 tablet (4 mg total) by mouth every 6 (six) hours, Starting Tue 2/6/2024, Normal           CONTINUE these medications which have NOT CHANGED    Details   rosuvastatin (CRESTOR) 5 mg tablet Take 5 mg by mouth daily, Historical Med      levETIRAcetam (KEPPRA) 500 mg tablet Take 500 mg by mouth every 12 (twelve) hours, Historical Med      Red Yeast Rice 600 MG CAPS Take 600 mg by mouth 2 (two) times a day, Historical Med           No discharge procedures on file.    PDMP Review       None             ED Provider  Attending physically available and evaluated Rachel Larson. I managed the patient along with the ED Attending.    Electronically Signed by           Krissy Stanley DO  02/11/24 0025

## 2024-02-08 LAB
ATRIAL RATE: 86 BPM
P AXIS: 45 DEGREES
PR INTERVAL: 138 MS
QRS AXIS: -16 DEGREES
QRSD INTERVAL: 80 MS
QT INTERVAL: 342 MS
QTC INTERVAL: 409 MS
T WAVE AXIS: 44 DEGREES
VENTRICULAR RATE: 86 BPM

## 2024-02-08 PROCEDURE — 93010 ELECTROCARDIOGRAM REPORT: CPT | Performed by: INTERNAL MEDICINE

## 2024-02-08 NOTE — ED ATTENDING ATTESTATION
2/6/2024  I, Celestina Caban MD, saw and evaluated the patient. I have discussed the patient with the resident/non-physician practitioner and agree with the resident's/non-physician practitioner's findings, Plan of Care, and MDM as documented in the resident's/non-physician practitioner's note, except where noted. All available labs and Radiology studies were reviewed.  I was present for key portions of any procedure(s) performed by the resident/non-physician practitioner and I was immediately available to provide assistance.       At this point I agree with the current assessment done in the Emergency Department.  I have conducted an independent evaluation of this patient a history and physical is as follows:    Patient is a 53-year-old female who presents to the emergency department following episode of syncope which occurred as she was in the process of passing watery diarrhea.  She fell forward striking her head and had brief loss of consciousness.  Several episodes of emesis followed.  Nausea is much improved at time of my assessment.  A couple of days ago she started with nasal congestion.  Earlier in the day today she appreciated fevers, chills, nausea and vomiting.  She did vomit the majority of a chicken sandwich which she questions the integrity of although she is already had some nausea and vomiting preceding this ingestion.  Vision was dim around time of syncope.  No diplopia or alternate vision abnormalities.  No weakness or paresthesias focally.    On exam she appears mildly malaised.  Mucous membranes are moist.  Heart sounds regular and lungs clear to auscultation bilaterally.  Abdomen is soft and nontender.  No cervical tenderness.  Clear speech. No facial asymmetry/ deficit appreciated.  Pupils briskly reactive to light.  EOMI.  No nystagmus. Strong eye closure & symmetric brow raise. Ability to insufflate cheeks, protrude tongue midline & range this laterally. Symmetric/ intact  sensation in the upper, mid & lower portions of the face.  5/5 strength on head turn, shoulder shrug, bicep, tricep & deltoid movement against resistance b/l.  5/5 strength on b/l hand , pincer grasp, finger abduction, dorsi & volar flexion, hip flexion, dorsi & plantar flexion. Symmetric grossly intact sensation in the UE & LE.  No dysmetria.     CT head was performed in consideration of possible ICH given repeated emesis following head impact.  Fortunately none visualized.  Discussed supportive care including staying well-hydrated and use of as needed antiemetic in setting of suspected viral gastroenteritis.  Current head pain may be representative of mild concussion.  PCP follow-up if headache persists.  Plan for p.o. challenge/reassessment prior to discharge.    ED Course         Critical Care Time  Procedures

## 2024-02-16 ENCOUNTER — OFFICE VISIT (OUTPATIENT)
Age: 54
End: 2024-02-16
Payer: COMMERCIAL

## 2024-02-16 VITALS — WEIGHT: 191 LBS | BODY MASS INDEX: 31.82 KG/M2 | HEIGHT: 65 IN

## 2024-02-16 DIAGNOSIS — M54.50 ACUTE BILATERAL LOW BACK PAIN WITHOUT SCIATICA: Primary | ICD-10-CM

## 2024-02-16 DIAGNOSIS — M79.18 MYOFASCIAL PAIN: ICD-10-CM

## 2024-02-16 DIAGNOSIS — M54.6 ACUTE BILATERAL THORACIC BACK PAIN: ICD-10-CM

## 2024-02-16 PROCEDURE — 98941 CHIROPRACT MANJ 3-4 REGIONS: CPT | Performed by: CHIROPRACTOR

## 2024-02-16 PROCEDURE — 99203 OFFICE O/P NEW LOW 30 MIN: CPT | Performed by: CHIROPRACTOR

## 2024-02-16 NOTE — PROGRESS NOTES
HPI:  Back Pain  This is a new problem. The current episode started more than 1 month ago. The problem occurs intermittently. The problem has been waxing and waning since onset. The pain is present in the lumbar spine, sacro-iliac and thoracic spine. The quality of the pain is described as aching. The pain does not radiate. The pain is at a severity of 7/10. The pain is moderate. The symptoms are aggravated by bending, standing and twisting. She has tried chiropractic manipulation and heat for the symptoms. The treatment provided moderate relief.     Rachel Larson is a 54 y.o. female who presents for evaluation and possible treatment of relatively recent onset low back pain radiating superior toward into the mid thoracic spine.  She relates no significant trauma however was bending over and doing some lifting when she felt insidious onset of back pain.  She denies any lower extremity radiation of symptoms denies any numbness or tingling, denies any weakness, reports no associated bowel bladder issues.    Pain is exacerbated by arising out of a seated position bending and twisting alleviated by stretching.  She has not utilized any other means of ice or heat.    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    She relates no regular exercise program but notes that she is extremely active on a 7-day a week basis.  She does have a past history for similar incidences which occur from time to time and have been amenable to chiropractic intervention.      Chief Complaint   Patient presents with   • Back Pain     Lower lumbar pain that radiates up to mid back . Patient states sharp pain that is constant when standing. Pain score 6-7       Past Medical History:   Diagnosis Date   • Brain abscess    • Hyperlipidemia    • Peripheral neuropathy       Past Surgical History:   Procedure Laterality Date   • BRAIN SURGERY       History reviewed. No  pertinent family history.  Social History     Socioeconomic History   • Marital status: /Civil Union     Spouse name: None   • Number of children: None   • Years of education: None   • Highest education level: None   Occupational History   • None   Tobacco Use   • Smoking status: Former   • Smokeless tobacco: Never   Substance and Sexual Activity   • Alcohol use: None   • Drug use: No   • Sexual activity: None   Other Topics Concern   • None   Social History Narrative   • None     Social Determinants of Health     Financial Resource Strain: Low Risk  (9/9/2023)    Received from [facility]    Overall Financial Resource Strain (CARDIA)    • Difficulty of Paying Living Expenses: Not hard at all   Food Insecurity: No Food Insecurity (9/9/2023)    Received from [facility]    Hunger Vital Sign    • Worried About Running Out of Food in the Last Year: Never true    • Ran Out of Food in the Last Year: Never true   Transportation Needs: No Transportation Needs (9/9/2023)    Received from [facility]    PRAPARE - Transportation    • Lack of Transportation (Medical): No    • Lack of Transportation (Non-Medical): No   Physical Activity: Not on file   Stress: Not on file   Social Connections: Not on file   Intimate Partner Violence: Not At Risk (9/9/2023)    Received from [facility]    Humiliation, Afraid, Rape, and Kick questionnaire    • Fear of Current or Ex-Partner: No    • Emotionally Abused: No    • Physically Abused: No    • Sexually Abused: No   Housing Stability: Low Risk  (9/9/2023)    Received from [facility]    Housing Stability Vital Sign    • Unable to Pay for Housing in the Last Year: No    • Number of Places Lived in the Last Year: 1    • Unstable Housing in the Last Year: No       Current Outpatient Medications:   •  levETIRAcetam (KEPPRA) 500 mg tablet, Take 500 mg by mouth every 12 (twelve) hours, Disp: , Rfl:   •   ondansetron (ZOFRAN) 4 mg tablet, Take 1 tablet (4 mg total) by mouth every 6 (six) hours, Disp: 12 tablet, Rfl: 0  •  Red Yeast Rice 600 MG CAPS, Take 600 mg by mouth 2 (two) times a day, Disp: , Rfl:   •  rosuvastatin (CRESTOR) 5 mg tablet, Take 5 mg by mouth daily, Disp: , Rfl:   Allergies as of 02/16/2024 - Reviewed 02/16/2024   Allergen Reaction Noted   • Percocet [oxycodone-acetaminophen] Other (See Comments) 10/08/2017   • Mysoline [primidone] Other (See Comments) 10/08/2017   • Hydromorphone GI Intolerance 10/08/2017   • Sulfa antibiotics Hives 10/08/2017         The following portions of the patient's history were reviewed and updated as appropriate: allergies, current medications, past family history, past medical history, past social history, past surgical history, and problem list.    Review of Systems   Constitutional: Negative.    Musculoskeletal:  Positive for back pain.   Neurological: Negative.    Psychiatric/Behavioral: Negative.         Physical Exam:  Physical Exam  Vitals reviewed.   Constitutional:       Appearance: Normal appearance.   Cardiovascular:      Rate and Rhythm: Normal rate.      Pulses: Normal pulses.   Pulmonary:      Effort: Pulmonary effort is normal.   Musculoskeletal:      Lumbar back: Spasms and tenderness present. Decreased range of motion. Negative right straight leg raise test and negative left straight leg raise test.        Back:       Comments: Pelvic obliquity leg with any quality elevated left versus right innominate internal rotation of the left innominate on sacrum.   Skin:     General: Skin is warm and dry.   Neurological:      General: No focal deficit present.      Mental Status: She is alert and oriented to person, place, and time.      Sensory: Sensation is intact.      Motor: Motor function is intact.      Gait: Gait is intact.      Deep Tendon Reflexes: Reflexes are normal and symmetric.   Psychiatric:         Mood and Affect: Mood normal.         Behavior:  Behavior normal.         Thought Content: Thought content normal.         Assessment:  Diagnoses and all orders for this visit:    Acute bilateral low back pain without sciatica    Acute bilateral thoracic back pain    Myofascial pain         Treatment:  08184  Manipulation to the left innominate, sacrum, L5 as well as the T6-T7 motion units all well-tolerated without complication.    Discussion:  I reviewed past medical history.  Discussed case in detail with patient today.  We are going to have intervention she should respond well reassessment in 4 weeks.  No follow-ups on file.

## 2024-02-26 ENCOUNTER — PROCEDURE VISIT (OUTPATIENT)
Age: 54
End: 2024-02-26
Payer: COMMERCIAL

## 2024-02-26 VITALS
DIASTOLIC BLOOD PRESSURE: 83 MMHG | SYSTOLIC BLOOD PRESSURE: 123 MMHG | HEIGHT: 65 IN | BODY MASS INDEX: 31.82 KG/M2 | WEIGHT: 191 LBS

## 2024-02-26 DIAGNOSIS — M54.6 ACUTE BILATERAL THORACIC BACK PAIN: ICD-10-CM

## 2024-02-26 DIAGNOSIS — M54.50 ACUTE BILATERAL LOW BACK PAIN WITHOUT SCIATICA: Primary | ICD-10-CM

## 2024-02-26 DIAGNOSIS — M79.18 MYOFASCIAL PAIN: ICD-10-CM

## 2024-02-26 PROCEDURE — 98941 CHIROPRACT MANJ 3-4 REGIONS: CPT | Performed by: CHIROPRACTOR

## 2024-02-28 NOTE — PROGRESS NOTES
HPI:  Meghan returns for treatment today for left-sided neck and shoulder blade pain as well as left-sided low back and hip pain.  She reports overall improvement.    VPAS  4    The following portions of the patient's history were reviewed and updated as appropriate: allergies, current medications, past family history, past medical history, past social history, past surgical history, and problem list.    Review of Systems    Physical Exam:  Examination reveals left-sided paracervical hypertonicity noted minimal tenderness on palpation myofascial involvement bilateral trapezii, rhomboid, and levator scapular musculature.  Decreased cervical range of motion joint dysfunction noted T4-T5.    Left-sided parasacral hypertonicity noted active triggers are present in the left gluteus medius, left piriformis, left and left quadratus lumborum.  Decreased lumbar range of motion.  Biomechanically joint dysfunction left SI L5-S1 motion unit    Assessment:   Diagnosis ICD-10-CM Associated Orders   1. Acute bilateral low back pain without sciatica  M54.50       2. Acute bilateral thoracic back pain  M54.6       3. Myofascial pain  M79.18                 Treatment: 38149  Manipulation to the T4, L5, sacrum and left innominate all well-tolerated.  Pretreat MFR neck upper back    Discussion:  Reviewed home stretching see her back for follow-up

## 2024-03-04 ENCOUNTER — PROCEDURE VISIT (OUTPATIENT)
Age: 54
End: 2024-03-04
Payer: COMMERCIAL

## 2024-03-04 VITALS
BODY MASS INDEX: 31.82 KG/M2 | HEIGHT: 65 IN | DIASTOLIC BLOOD PRESSURE: 84 MMHG | HEART RATE: 81 BPM | WEIGHT: 191 LBS | SYSTOLIC BLOOD PRESSURE: 122 MMHG

## 2024-03-04 DIAGNOSIS — M54.50 ACUTE BILATERAL LOW BACK PAIN WITHOUT SCIATICA: Primary | ICD-10-CM

## 2024-03-04 DIAGNOSIS — M54.6 ACUTE BILATERAL THORACIC BACK PAIN: ICD-10-CM

## 2024-03-04 DIAGNOSIS — M79.18 MYOFASCIAL PAIN: ICD-10-CM

## 2024-03-04 PROCEDURE — 98941 CHIROPRACT MANJ 3-4 REGIONS: CPT | Performed by: CHIROPRACTOR

## 2024-03-04 NOTE — PROGRESS NOTES
HPI:  Rachel returns for treatment today for left-sided neck and shoulder blade pain as well as left-sided low back and hip pain.  She reports overall improvement.    VPAS  4-5    The following portions of the patient's history were reviewed and updated as appropriate: allergies, current medications, past family history, past medical history, past social history, past surgical history, and problem list.    Review of Systems    Physical Exam:  Examination reveals left-sided paracervical hypertonicity noted minimal tenderness on palpation myofascial involvement bilateral trapezii, rhomboid, and levator scapular musculature.  Decreased cervical range of motion joint dysfunction noted T4-T5.    Left-sided parasacral hypertonicity noted active triggers are present in the left gluteus medius, left piriformis, left and left quadratus lumborum.  Decreased lumbar range of motion.  Biomechanically joint dysfunction left SI L5-S1 motion unit    Assessment:   Diagnosis ICD-10-CM Associated Orders   1. Acute bilateral low back pain without sciatica  M54.50       2. Acute bilateral thoracic back pain  M54.6       3. Myofascial pain  M79.18                 Treatment: 01542  Manipulation to the T4, L5, sacrum and left innominate all well-tolerated.  Pretreat MFR neck upper back    Discussion:  Reviewed home stretching see her back for follow-up

## 2024-03-11 ENCOUNTER — PROCEDURE VISIT (OUTPATIENT)
Age: 54
End: 2024-03-11
Payer: COMMERCIAL

## 2024-03-11 VITALS
DIASTOLIC BLOOD PRESSURE: 86 MMHG | WEIGHT: 191 LBS | HEART RATE: 82 BPM | BODY MASS INDEX: 31.82 KG/M2 | HEIGHT: 65 IN | SYSTOLIC BLOOD PRESSURE: 123 MMHG

## 2024-03-11 DIAGNOSIS — M54.50 ACUTE BILATERAL LOW BACK PAIN WITHOUT SCIATICA: Primary | ICD-10-CM

## 2024-03-11 DIAGNOSIS — M79.18 MYOFASCIAL PAIN: ICD-10-CM

## 2024-03-11 DIAGNOSIS — M54.6 ACUTE BILATERAL THORACIC BACK PAIN: ICD-10-CM

## 2024-03-11 PROCEDURE — 98941 CHIROPRACT MANJ 3-4 REGIONS: CPT | Performed by: CHIROPRACTOR

## 2024-03-11 RX ORDER — AMOXICILLIN AND CLAVULANATE POTASSIUM 875; 125 MG/1; MG/1
1 TABLET, FILM COATED ORAL 2 TIMES DAILY
COMMUNITY
Start: 2024-03-04 | End: 2024-03-14

## 2024-03-11 NOTE — PROGRESS NOTES
HPI:  Rachel returns for treatment today for left-sided neck and shoulder blade pain as well as left-sided low back and hip pain.  She reports overall improvement.    VPAS  3    The following portions of the patient's history were reviewed and updated as appropriate: allergies, current medications, past family history, past medical history, past social history, past surgical history, and problem list.    Review of Systems    Physical Exam:  Examination reveals left-sided paracervical hypertonicity noted minimal tenderness on palpation myofascial involvement bilateral trapezii, rhomboid, and levator scapular musculature.  Decreased cervical range of motion joint dysfunction noted T4-T5.    Left-sided parasacral hypertonicity noted active triggers are present in the left gluteus medius, left piriformis, left and left quadratus lumborum.  Decreased lumbar range of motion.  Biomechanically joint dysfunction left SI L5-S1 motion unit    Assessment:   Diagnosis ICD-10-CM Associated Orders   1. Acute bilateral low back pain without sciatica  M54.50       2. Acute bilateral thoracic back pain  M54.6       3. Myofascial pain  M79.18                 Treatment: 42559  Manipulation to the T4, L5, sacrum and left innominate all well-tolerated.  Pretreat MFR neck upper back    Discussion:  Reviewed home stretching see her back for follow-up

## 2024-03-18 ENCOUNTER — PROCEDURE VISIT (OUTPATIENT)
Age: 54
End: 2024-03-18
Payer: COMMERCIAL

## 2024-03-18 VITALS
HEIGHT: 65 IN | SYSTOLIC BLOOD PRESSURE: 124 MMHG | HEART RATE: 79 BPM | WEIGHT: 191 LBS | DIASTOLIC BLOOD PRESSURE: 84 MMHG | BODY MASS INDEX: 31.82 KG/M2

## 2024-03-18 DIAGNOSIS — M54.50 ACUTE BILATERAL LOW BACK PAIN WITHOUT SCIATICA: Primary | ICD-10-CM

## 2024-03-18 DIAGNOSIS — M54.6 ACUTE BILATERAL THORACIC BACK PAIN: ICD-10-CM

## 2024-03-18 DIAGNOSIS — M79.18 MYOFASCIAL PAIN: ICD-10-CM

## 2024-03-18 PROCEDURE — 98941 CHIROPRACT MANJ 3-4 REGIONS: CPT | Performed by: CHIROPRACTOR

## 2024-03-18 NOTE — PROGRESS NOTES
HPI:  Rachel returns for treatment today for left-sided neck and shoulder blade pain as well as left-sided low back and hip pain.  She reports overall improvement.    VPAS  1-2    The following portions of the patient's history were reviewed and updated as appropriate: allergies, current medications, past family history, past medical history, past social history, past surgical history, and problem list.    Review of Systems    Physical Exam:  Examination reveals left-sided paracervical hypertonicity noted minimal tenderness on palpation myofascial involvement bilateral trapezii, rhomboid, and levator scapular musculature.  Decreased cervical range of motion joint dysfunction noted T4-T5.    Left-sided parasacral hypertonicity noted active triggers are present in the left gluteus medius, left piriformis, left and left quadratus lumborum.  Decreased lumbar range of motion.  Biomechanically joint dysfunction left SI L5-S1 motion unit    Assessment:   Diagnosis ICD-10-CM Associated Orders   1. Acute bilateral low back pain without sciatica  M54.50       2. Acute bilateral thoracic back pain  M54.6       3. Myofascial pain  M79.18                 Treatment: 56999  Manipulation to the T4, L5, sacrum and left innominate all well-tolerated.  Pretreat MFR neck upper back    Discussion:  Reviewed home stretching see her back for follow-up

## 2024-04-01 ENCOUNTER — PROCEDURE VISIT (OUTPATIENT)
Age: 54
End: 2024-04-01
Payer: COMMERCIAL

## 2024-04-01 VITALS
WEIGHT: 191 LBS | HEIGHT: 65 IN | SYSTOLIC BLOOD PRESSURE: 128 MMHG | HEART RATE: 88 BPM | DIASTOLIC BLOOD PRESSURE: 84 MMHG | BODY MASS INDEX: 31.82 KG/M2

## 2024-04-01 DIAGNOSIS — M79.18 MYOFASCIAL PAIN: ICD-10-CM

## 2024-04-01 DIAGNOSIS — M54.50 ACUTE BILATERAL LOW BACK PAIN WITHOUT SCIATICA: Primary | ICD-10-CM

## 2024-04-01 DIAGNOSIS — M54.6 ACUTE BILATERAL THORACIC BACK PAIN: ICD-10-CM

## 2024-04-01 PROCEDURE — 98941 CHIROPRACT MANJ 3-4 REGIONS: CPT | Performed by: CHIROPRACTOR

## 2024-04-01 RX ORDER — LEVETIRACETAM 500 MG/1
500 TABLET, FILM COATED, EXTENDED RELEASE ORAL DAILY
COMMUNITY
Start: 2024-03-19

## 2024-04-05 NOTE — PROGRESS NOTES
HPI:  Rachel returns for treatment today for left-sided neck and shoulder blade pain as well as left-sided low back and hip pain.  She reports overall improvement.    VPAS  0-1    The following portions of the patient's history were reviewed and updated as appropriate: allergies, current medications, past family history, past medical history, past social history, past surgical history, and problem list.    Review of Systems    Physical Exam:  Examination reveals left-sided paracervical hypertonicity noted minimal tenderness on palpation myofascial involvement bilateral trapezii, rhomboid, and levator scapular musculature.  Decreased cervical range of motion joint dysfunction noted T4-T5.    Left-sided parasacral hypertonicity noted active triggers are present in the left gluteus medius, left piriformis, left and left quadratus lumborum.  Decreased lumbar range of motion.  Biomechanically joint dysfunction left SI L5-S1 motion unit    Assessment:   Diagnosis ICD-10-CM Associated Orders   1. Acute bilateral low back pain without sciatica  M54.50       2. Acute bilateral thoracic back pain  M54.6       3. Myofascial pain  M79.18                 Treatment: 50461  Manipulation to the T4, L5, sacrum and left innominate all well-tolerated.  Pretreat MFR neck upper back    Discussion:  Reviewed home stretching see her back for follow-up, improving.

## 2024-04-15 ENCOUNTER — PROCEDURE VISIT (OUTPATIENT)
Age: 54
End: 2024-04-15
Payer: COMMERCIAL

## 2024-04-15 VITALS
HEART RATE: 82 BPM | DIASTOLIC BLOOD PRESSURE: 84 MMHG | SYSTOLIC BLOOD PRESSURE: 121 MMHG | WEIGHT: 191 LBS | HEIGHT: 65 IN | BODY MASS INDEX: 31.82 KG/M2

## 2024-04-15 DIAGNOSIS — M54.6 ACUTE BILATERAL THORACIC BACK PAIN: ICD-10-CM

## 2024-04-15 DIAGNOSIS — M54.50 ACUTE BILATERAL LOW BACK PAIN WITHOUT SCIATICA: Primary | ICD-10-CM

## 2024-04-15 DIAGNOSIS — M79.18 MYOFASCIAL PAIN: ICD-10-CM

## 2024-04-15 PROCEDURE — 98941 CHIROPRACT MANJ 3-4 REGIONS: CPT | Performed by: CHIROPRACTOR

## 2024-04-15 NOTE — PROGRESS NOTES
HPI:  Rachel returns for treatment today for left-sided neck and shoulder blade pain as well as left-sided low back and hip pain.  She reports overall improvement.    VPAS  1    The following portions of the patient's history were reviewed and updated as appropriate: allergies, current medications, past family history, past medical history, past social history, past surgical history, and problem list.    Review of Systems    Physical Exam:  Examination reveals left-sided paracervical hypertonicity noted minimal tenderness on palpation myofascial involvement bilateral trapezii, rhomboid, and levator scapular musculature.  Decreased cervical range of motion joint dysfunction noted T4-T5.    Left-sided parasacral hypertonicity noted active triggers are present in the left gluteus medius, left piriformis, left and left quadratus lumborum.  Decreased lumbar range of motion.  Biomechanically joint dysfunction left SI L5-S1 motion unit    Assessment:   Diagnosis ICD-10-CM Associated Orders   1. Acute bilateral low back pain without sciatica  M54.50       2. Acute bilateral thoracic back pain  M54.6       3. Myofascial pain  M79.18                 Treatment: 52150  Manipulation to the T4, L5, sacrum and left innominate all well-tolerated.  Pretreat MFR neck upper back    Discussion:  Reviewed home stretching see her back for follow-up, improving.

## 2024-05-06 ENCOUNTER — PROCEDURE VISIT (OUTPATIENT)
Age: 54
End: 2024-05-06
Payer: COMMERCIAL

## 2024-05-06 VITALS
WEIGHT: 191 LBS | SYSTOLIC BLOOD PRESSURE: 122 MMHG | HEIGHT: 65 IN | BODY MASS INDEX: 31.82 KG/M2 | HEART RATE: 64 BPM | DIASTOLIC BLOOD PRESSURE: 89 MMHG

## 2024-05-06 DIAGNOSIS — M54.50 ACUTE BILATERAL LOW BACK PAIN WITHOUT SCIATICA: Primary | ICD-10-CM

## 2024-05-06 DIAGNOSIS — M54.6 ACUTE BILATERAL THORACIC BACK PAIN: ICD-10-CM

## 2024-05-06 DIAGNOSIS — M79.18 MYOFASCIAL PAIN: ICD-10-CM

## 2024-05-06 PROCEDURE — 98941 CHIROPRACT MANJ 3-4 REGIONS: CPT | Performed by: CHIROPRACTOR

## 2024-05-06 NOTE — PROGRESS NOTES
HPI:  Rachel returns for treatment today for left-sided neck and shoulder blade pain as well as left-sided low back and hip pain.  She reports overall improvement.    VPAS  1    The following portions of the patient's history were reviewed and updated as appropriate: allergies, current medications, past family history, past medical history, past social history, past surgical history, and problem list.    Review of Systems    Physical Exam:  Examination reveals left-sided paracervical hypertonicity noted minimal tenderness on palpation myofascial involvement bilateral trapezii, rhomboid, and levator scapular musculature.  Decreased cervical range of motion joint dysfunction noted T4-T5.    Left-sided parasacral hypertonicity noted active triggers are present in the left gluteus medius, left piriformis, left and left quadratus lumborum.  Decreased lumbar range of motion.  Biomechanically joint dysfunction left SI L5-S1 motion unit    Assessment:   Diagnosis ICD-10-CM Associated Orders   1. Acute bilateral low back pain without sciatica  M54.50       2. Acute bilateral thoracic back pain  M54.6       3. Myofascial pain  M79.18                 Treatment: 31884  Manipulation to the T4, L5, sacrum and left innominate all well-tolerated.  Pretreat MFR neck upper back    Discussion:  Reviewed home stretching see her back for follow-up, improving.

## 2024-06-06 ENCOUNTER — OFFICE VISIT (OUTPATIENT)
Age: 54
End: 2024-06-06
Payer: COMMERCIAL

## 2024-06-06 VITALS
BODY MASS INDEX: 31.82 KG/M2 | HEIGHT: 65 IN | SYSTOLIC BLOOD PRESSURE: 123 MMHG | DIASTOLIC BLOOD PRESSURE: 85 MMHG | HEART RATE: 65 BPM | WEIGHT: 191 LBS

## 2024-06-06 DIAGNOSIS — M79.18 MYOFASCIAL PAIN: ICD-10-CM

## 2024-06-06 DIAGNOSIS — M54.2 NECK PAIN: Primary | ICD-10-CM

## 2024-06-06 PROCEDURE — 98940 CHIROPRACT MANJ 1-2 REGIONS: CPT | Performed by: CHIROPRACTOR

## 2024-06-07 NOTE — PROGRESS NOTES
HPI:  Meghan is in for treatment today of acute left-sided neck pain.  She reports over the past week or so she has tightened up in the neck and upper back however this morning upon awakening she was unable to turn her head she reports primarily axial neck pain left-sided and predominance without significant radicular symptomatology.  Denies any numbness or tingling.  Denies any weakness.    Application of moist heat gentle stretching increases in her range of motion she has been taking Tylenol as well.      The following portions of the patient's history were reviewed and updated as appropriate: allergies, current medications, past family history, past medical history, past social history, past surgical history, and problem list.    Review of Systems    Physical Exam:  Exam today reveals Meghan to be alert cooperative she is in no apparent distress.  She has palpable paracervical hypertonicity noted spasm in the left superior trapezius and left levator scapula musculature.  Cervical range of motion reduced 50% left rotation 75% right rotation 50% lateral bending bilaterally.  Biomechanically joint dysfunction C1-C2 C2-C3.  Neurologic exam upper extremities unremarkable no evidence of a positive Spurling's maneuver.    Assessment:   Diagnosis ICD-10-CM Associated Orders   1. Neck pain  M54.2       2. Myofascial pain  M79.18                 Treatment: 96553  Manipulation C1-C2 producing good joint release this is preceded by extensive myofascial release to the paracervical spine well-tolerated.  Immediate increase in cervical range of motion noted posttreatment.    Discussion:  Reviewed home care instructions she will work with these I will see her back for follow-up beginning of next week.

## 2024-06-12 ENCOUNTER — OFFICE VISIT (OUTPATIENT)
Age: 54
End: 2024-06-12
Payer: COMMERCIAL

## 2024-06-12 VITALS
BODY MASS INDEX: 31.82 KG/M2 | HEART RATE: 85 BPM | HEIGHT: 65 IN | WEIGHT: 191 LBS | DIASTOLIC BLOOD PRESSURE: 85 MMHG | SYSTOLIC BLOOD PRESSURE: 120 MMHG

## 2024-06-12 DIAGNOSIS — M79.18 MYOFASCIAL PAIN: ICD-10-CM

## 2024-06-12 DIAGNOSIS — M54.2 NECK PAIN: Primary | ICD-10-CM

## 2024-06-12 DIAGNOSIS — M54.50 ACUTE BILATERAL LOW BACK PAIN WITHOUT SCIATICA: ICD-10-CM

## 2024-06-12 PROCEDURE — 98941 CHIROPRACT MANJ 3-4 REGIONS: CPT | Performed by: CHIROPRACTOR

## 2024-06-12 NOTE — PROGRESS NOTES
HPI:  Rachel returns for treatment of acute neck and ongoing low back pain.  She reports definite improvement in regards to her neck symptoms.  Denies any numbness or tingling.  Denies any weakness.    Application of moist heat gentle stretching increases in her range of motion she has been taking Tylenol as well.      The following portions of the patient's history were reviewed and updated as appropriate: allergies, current medications, past family history, past medical history, past social history, past surgical history, and problem list.    Review of Systems    Physical Exam:    Exam reveals significant improvement in regards to overall cervical range of motion with increases in all planes of movement she still does have some left-sided paracervical hypertonicity noted.  Biomechanically joint dysfunction C5-C6 and extension left lateral bending right rotation.    Parathoracic hypertonicity is noted pelvic obliquity elevated right versus left innominate leg with any quality biomechanical joint dysfunction present in the right SI and L5-S1 motion unit  Assessment:   Diagnosis ICD-10-CM Associated Orders   1. Neck pain  M54.2       2. Myofascial pain  M79.18       3. Acute bilateral low back pain without sciatica  M54.50                 Treatment: 98481  Manipulation right SI L5-S1 via Andino drop maneuver well-tolerated.  Myofascial release paracervical spine with manipulation C5-C6 also well-tolerated    Discussion:  Reviewed home care instructions she will work with these I will see her back for follow-up in 2 weeks.

## 2024-06-25 ENCOUNTER — PROCEDURE VISIT (OUTPATIENT)
Age: 54
End: 2024-06-25
Payer: COMMERCIAL

## 2024-06-25 VITALS
HEIGHT: 65 IN | BODY MASS INDEX: 31.82 KG/M2 | DIASTOLIC BLOOD PRESSURE: 84 MMHG | HEART RATE: 67 BPM | SYSTOLIC BLOOD PRESSURE: 110 MMHG | WEIGHT: 191 LBS

## 2024-06-25 DIAGNOSIS — M54.6 ACUTE BILATERAL THORACIC BACK PAIN: ICD-10-CM

## 2024-06-25 DIAGNOSIS — M54.50 ACUTE BILATERAL LOW BACK PAIN WITHOUT SCIATICA: ICD-10-CM

## 2024-06-25 DIAGNOSIS — M54.2 NECK PAIN: Primary | ICD-10-CM

## 2024-06-25 DIAGNOSIS — M79.18 MYOFASCIAL PAIN: ICD-10-CM

## 2024-06-25 PROCEDURE — 98941 CHIROPRACT MANJ 3-4 REGIONS: CPT | Performed by: CHIROPRACTOR

## 2024-06-25 NOTE — PROGRESS NOTES
HPI:  Rachel returns for treatment of acute neck and ongoing low back pain.  She reports definite improvement in regards to her neck symptoms.  Denies any numbness or tingling.  Denies any weakness.    Application of moist heat gentle stretching increases in her range of motion she has been taking Tylenol as well.      The following portions of the patient's history were reviewed and updated as appropriate: allergies, current medications, past family history, past medical history, past social history, past surgical history, and problem list.    Review of Systems    Physical Exam:    Exam reveals significant improvement in regards to overall cervical range of motion with increases in all planes of movement she still does have some left-sided paracervical hypertonicity noted.  Biomechanically joint dysfunction C5-C6 and extension left lateral bending right rotation.    Parathoracic hypertonicity is noted pelvic obliquity elevated right versus left innominate leg with any quality biomechanical joint dysfunction present in the right SI and L5-S1 motion unit  Assessment:   Diagnosis ICD-10-CM Associated Orders   1. Neck pain  M54.2       2. Myofascial pain  M79.18       3. Acute bilateral low back pain without sciatica  M54.50       4. Acute bilateral thoracic back pain  M54.6                 Treatment: 56208  Manipulation right SI L5-S1 via Andino drop maneuver well-tolerated.  Myofascial release paracervical spine with manipulation C5-C6 also well-tolerated    Discussion:  Reviewed home care instructions she will work with these I will see her back for follow-up in 2 weeks.

## 2024-07-10 ENCOUNTER — PROCEDURE VISIT (OUTPATIENT)
Age: 54
End: 2024-07-10
Payer: COMMERCIAL

## 2024-07-10 VITALS — BODY MASS INDEX: 31.82 KG/M2 | WEIGHT: 191 LBS | HEIGHT: 65 IN

## 2024-07-10 DIAGNOSIS — M54.50 ACUTE BILATERAL LOW BACK PAIN WITHOUT SCIATICA: ICD-10-CM

## 2024-07-10 DIAGNOSIS — M54.2 NECK PAIN: Primary | ICD-10-CM

## 2024-07-10 DIAGNOSIS — M79.18 MYOFASCIAL PAIN: ICD-10-CM

## 2024-07-10 PROCEDURE — 98941 CHIROPRACT MANJ 3-4 REGIONS: CPT | Performed by: CHIROPRACTOR

## 2024-07-10 NOTE — PROGRESS NOTES
HPI:  Rachel returns for treatment of acute neck and ongoing low back pain.  She reports definite improvement in regards to her neck symptoms.  Denies any numbness or tingling.  Denies any weakness.    VPAS  1-2      The following portions of the patient's history were reviewed and updated as appropriate: allergies, current medications, past family history, past medical history, past social history, past surgical history, and problem list.    Review of Systems    Physical Exam:    Exam reveals significant improvement in regards to overall cervical range of motion with increases in all planes of movement she still does have some left-sided paracervical hypertonicity noted.  Biomechanically joint dysfunction C5-C6 and extension left lateral bending right rotation.    Parathoracic hypertonicity is noted pelvic obliquity elevated right versus left innominate leg with any quality biomechanical joint dysfunction present in the right SI and L5-S1 motion unit  Assessment:   Diagnosis ICD-10-CM Associated Orders   1. Neck pain  M54.2       2. Myofascial pain  M79.18       3. Acute bilateral low back pain without sciatica  M54.50                 Treatment: 79967  Manipulation right SI L5-S1 via Andino drop maneuver well-tolerated.  Myofascial release paracervical spine with manipulation C5-C6 also well-tolerated    Discussion:  Reviewed home care instructions she will work with these I will see her back for follow-up in 2 weeks.

## 2024-07-29 ENCOUNTER — PROCEDURE VISIT (OUTPATIENT)
Age: 54
End: 2024-07-29
Payer: COMMERCIAL

## 2024-07-29 VITALS
SYSTOLIC BLOOD PRESSURE: 114 MMHG | HEART RATE: 72 BPM | WEIGHT: 191 LBS | DIASTOLIC BLOOD PRESSURE: 81 MMHG | BODY MASS INDEX: 31.82 KG/M2 | HEIGHT: 65 IN

## 2024-07-29 DIAGNOSIS — M54.6 ACUTE BILATERAL THORACIC BACK PAIN: ICD-10-CM

## 2024-07-29 DIAGNOSIS — M79.18 MYOFASCIAL PAIN: ICD-10-CM

## 2024-07-29 DIAGNOSIS — M54.2 NECK PAIN: Primary | ICD-10-CM

## 2024-07-29 DIAGNOSIS — M54.50 ACUTE BILATERAL LOW BACK PAIN WITHOUT SCIATICA: ICD-10-CM

## 2024-07-29 PROCEDURE — 98941 CHIROPRACT MANJ 3-4 REGIONS: CPT | Performed by: CHIROPRACTOR

## 2024-07-29 NOTE — PROGRESS NOTES
HPI:  Rahcel returns for treatment of acute neck and ongoing low back pain.  She reports definite improvement in regards to her neck symptoms.  Denies any numbness or tingling.  Denies any weakness.    VPAS  2      The following portions of the patient's history were reviewed and updated as appropriate: allergies, current medications, past family history, past medical history, past social history, past surgical history, and problem list.    Review of Systems    Physical Exam:    Exam reveals significant improvement in regards to overall cervical range of motion with increases in all planes of movement she still does have some left-sided paracervical hypertonicity noted.  Biomechanically joint dysfunction C5-C6 and extension left lateral bending right rotation.    Parathoracic hypertonicity is noted pelvic obliquity elevated right versus left innominate leg with any quality biomechanical joint dysfunction present in the right SI and L5-S1 motion unit  Assessment:   Diagnosis ICD-10-CM Associated Orders   1. Neck pain  M54.2       2. Myofascial pain  M79.18       3. Acute bilateral low back pain without sciatica  M54.50       4. Acute bilateral thoracic back pain  M54.6                 Treatment: 56609  Manipulation right SI L5-S1 via Andino drop maneuver well-tolerated.  Myofascial release paracervical spine with manipulation C5-C6 also well-tolerated    Discussion:  Reviewed home care instructions she will work with these I will see her back for follow-up in 3 weeks.

## 2024-08-19 ENCOUNTER — PROCEDURE VISIT (OUTPATIENT)
Age: 54
End: 2024-08-19
Payer: COMMERCIAL

## 2024-08-19 VITALS
BODY MASS INDEX: 31.82 KG/M2 | DIASTOLIC BLOOD PRESSURE: 84 MMHG | WEIGHT: 191 LBS | HEIGHT: 65 IN | HEART RATE: 82 BPM | SYSTOLIC BLOOD PRESSURE: 125 MMHG

## 2024-08-19 DIAGNOSIS — M54.50 ACUTE BILATERAL LOW BACK PAIN WITHOUT SCIATICA: ICD-10-CM

## 2024-08-19 DIAGNOSIS — M54.6 ACUTE BILATERAL THORACIC BACK PAIN: ICD-10-CM

## 2024-08-19 DIAGNOSIS — M54.2 NECK PAIN: Primary | ICD-10-CM

## 2024-08-19 DIAGNOSIS — M79.18 MYOFASCIAL PAIN: ICD-10-CM

## 2024-08-19 PROCEDURE — 98941 CHIROPRACT MANJ 3-4 REGIONS: CPT | Performed by: CHIROPRACTOR

## 2024-08-19 NOTE — PROGRESS NOTES
HPI:  Rachel returns for treatment of acute neck and ongoing low back pain.  She reports definite improvement in regards to her neck and low back symptoms.  Denies any numbness or tingling.  Denies any weakness.    VPAS  0-1      The following portions of the patient's history were reviewed and updated as appropriate: allergies, current medications, past family history, past medical history, past social history, past surgical history, and problem list.    Review of Systems    Physical Exam:    Exam reveals significant improvement in regards to overall cervical range of motion with increases in all planes of movement she still does have some left-sided paracervical hypertonicity noted.  Biomechanically joint dysfunction C5-C6 and extension left lateral bending right rotation.    Parathoracic hypertonicity is noted pelvic obliquity elevated right versus left innominate leg with any quality biomechanical joint dysfunction present in the right SI and L5-S1 motion unit  Assessment:   Diagnosis ICD-10-CM Associated Orders   1. Neck pain  M54.2       2. Myofascial pain  M79.18       3. Acute bilateral low back pain without sciatica  M54.50       4. Acute bilateral thoracic back pain  M54.6                 Treatment: 17121  Manipulation right SI L5-S1 via Andino drop maneuver well-tolerated.  Myofascial release paracervical spine with manipulation C5-C6 also well-tolerated    Discussion:  Reviewed home care instructions she will work with these I will see her back for follow-up in 4 weeks.

## 2024-09-16 ENCOUNTER — PROCEDURE VISIT (OUTPATIENT)
Age: 54
End: 2024-09-16
Payer: COMMERCIAL

## 2024-09-16 VITALS — HEIGHT: 65 IN | BODY MASS INDEX: 31.82 KG/M2 | WEIGHT: 191 LBS

## 2024-09-16 DIAGNOSIS — M54.2 NECK PAIN: Primary | ICD-10-CM

## 2024-09-16 DIAGNOSIS — M54.6 ACUTE BILATERAL THORACIC BACK PAIN: ICD-10-CM

## 2024-09-16 DIAGNOSIS — M79.18 MYOFASCIAL PAIN: ICD-10-CM

## 2024-09-16 DIAGNOSIS — M54.50 ACUTE BILATERAL LOW BACK PAIN WITHOUT SCIATICA: ICD-10-CM

## 2024-09-16 PROCEDURE — 98941 CHIROPRACT MANJ 3-4 REGIONS: CPT | Performed by: CHIROPRACTOR

## 2024-09-16 RX ORDER — IBUPROFEN 400 MG/1
400 TABLET, FILM COATED ORAL EVERY 4 HOURS PRN
COMMUNITY

## 2024-09-17 NOTE — PROGRESS NOTES
HPI:  Rachel returns for treatment of acute neck and ongoing low back pain.  She reports definite improvement in regards to her neck and low back symptoms.  Denies any numbness or tingling.  Denies any weakness.  Has an upcoming colon resection scheduled this week.    VPAS  1-2      The following portions of the patient's history were reviewed and updated as appropriate: allergies, current medications, past family history, past medical history, past social history, past surgical history, and problem list.    Review of Systems    Physical Exam:    Exam reveals significant improvement in regards to overall cervical range of motion with increases in all planes of movement she still does have some left-sided paracervical hypertonicity noted.  Biomechanically joint dysfunction C5-C6 and extension left lateral bending right rotation.    Parathoracic hypertonicity is noted pelvic obliquity elevated right versus left innominate leg with any quality biomechanical joint dysfunction present in the right SI and L5-S1 motion unit  Assessment:   Diagnosis ICD-10-CM Associated Orders   1. Neck pain  M54.2       2. Myofascial pain  M79.18       3. Acute bilateral low back pain without sciatica  M54.50       4. Acute bilateral thoracic back pain  M54.6                 Treatment: 58805  Manipulation right SI L5-S1 via Andino drop maneuver well-tolerated.  Myofascial release paracervical spine with manipulation C5-C6 also well-tolerated    Discussion:  Reviewed home care instructions she will work with these I will see her back for follow-up in 6 weeks.

## 2024-10-31 DIAGNOSIS — Z00.6 ENCOUNTER FOR EXAMINATION FOR NORMAL COMPARISON OR CONTROL IN CLINICAL RESEARCH PROGRAM: ICD-10-CM

## 2024-11-04 ENCOUNTER — PROCEDURE VISIT (OUTPATIENT)
Age: 54
End: 2024-11-04
Payer: COMMERCIAL

## 2024-11-04 VITALS
SYSTOLIC BLOOD PRESSURE: 125 MMHG | WEIGHT: 183 LBS | HEIGHT: 65 IN | HEART RATE: 89 BPM | BODY MASS INDEX: 30.49 KG/M2 | DIASTOLIC BLOOD PRESSURE: 85 MMHG

## 2024-11-04 DIAGNOSIS — M79.18 MYOFASCIAL PAIN: ICD-10-CM

## 2024-11-04 DIAGNOSIS — M54.6 ACUTE BILATERAL THORACIC BACK PAIN: ICD-10-CM

## 2024-11-04 DIAGNOSIS — M54.2 NECK PAIN: Primary | ICD-10-CM

## 2024-11-04 DIAGNOSIS — M54.50 ACUTE BILATERAL LOW BACK PAIN WITHOUT SCIATICA: ICD-10-CM

## 2024-11-04 PROCEDURE — 98941 CHIROPRACT MANJ 3-4 REGIONS: CPT | Performed by: CHIROPRACTOR

## 2024-11-04 NOTE — PROGRESS NOTES
HPI:  Rachel returns for treatment reports tightness in the neck left-sided low back pain 2 on a pain scale.  She is recovering from colon resection was down for about 6 weeks although has the released back to regular activities slowly working back into her daily routines.    The following portions of the patient's history were reviewed and updated as appropriate: allergies, current medications, past family history, past medical history, past social history, past surgical history, and problem list.    Review of Systems    Physical Exam:    Exam reveals significant improvement in regards to overall cervical range of motion with increases in all planes of movement she still does have some left-sided paracervical hypertonicity noted.  Biomechanically joint dysfunction C5-C6 and extension left lateral bending right rotation.    Parathoracic hypertonicity is noted pelvic obliquity elevated right versus left innominate leg with any quality biomechanical joint dysfunction present in the right SI and L5-S1 motion unit  Assessment:   Diagnosis ICD-10-CM Associated Orders   1. Neck pain  M54.2       2. Myofascial pain  M79.18       3. Acute bilateral low back pain without sciatica  M54.50       4. Acute bilateral thoracic back pain  M54.6                 Treatment: 44610  Manipulation right SI L5-S1 via Andino drop maneuver well-tolerated.  Myofascial release paracervical spine with manipulation C5-C6 also well-tolerated    Discussion:  Reviewed home care instructions she will work with these I will see her back for follow-up .

## 2024-11-13 ENCOUNTER — APPOINTMENT (OUTPATIENT)
Dept: LAB | Facility: MEDICAL CENTER | Age: 54
End: 2024-11-13

## 2024-11-13 DIAGNOSIS — Z00.6 ENCOUNTER FOR EXAMINATION FOR NORMAL COMPARISON OR CONTROL IN CLINICAL RESEARCH PROGRAM: ICD-10-CM

## 2024-11-13 PROCEDURE — 36415 COLL VENOUS BLD VENIPUNCTURE: CPT

## 2024-11-25 LAB
APOB+LDLR+PCSK9 GENE MUT ANL BLD/T: NOT DETECTED
BRCA1+BRCA2 DEL+DUP + FULL MUT ANL BLD/T: NOT DETECTED
MLH1+MSH2+MSH6+PMS2 GN DEL+DUP+FUL M: NOT DETECTED

## 2024-12-04 ENCOUNTER — PROCEDURE VISIT (OUTPATIENT)
Age: 54
End: 2024-12-04
Payer: COMMERCIAL

## 2024-12-04 VITALS
HEART RATE: 71 BPM | HEIGHT: 65 IN | BODY MASS INDEX: 30.49 KG/M2 | WEIGHT: 183 LBS | DIASTOLIC BLOOD PRESSURE: 74 MMHG | SYSTOLIC BLOOD PRESSURE: 117 MMHG

## 2024-12-04 DIAGNOSIS — M79.18 MYOFASCIAL PAIN: ICD-10-CM

## 2024-12-04 DIAGNOSIS — M54.50 ACUTE BILATERAL LOW BACK PAIN WITHOUT SCIATICA: ICD-10-CM

## 2024-12-04 DIAGNOSIS — M54.2 NECK PAIN: Primary | ICD-10-CM

## 2024-12-04 DIAGNOSIS — M54.6 ACUTE BILATERAL THORACIC BACK PAIN: ICD-10-CM

## 2024-12-04 PROCEDURE — 98942 CHIROPRACTIC MANJ 5 REGIONS: CPT | Performed by: CHIROPRACTOR

## 2024-12-04 NOTE — PROGRESS NOTES
HPI:  Rachel returns for treatment reports tightness in the neck left-sided low back pain 1-4 on a pain scale.  Mid back extremely tight.    The following portions of the patient's history were reviewed and updated as appropriate: allergies, current medications, past family history, past medical history, past social history, past surgical history, and problem list.    Review of Systems    Physical Exam:    Exam reveals significant improvement in regards to overall cervical range of motion with increases in all planes of movement she still does have some left-sided paracervical hypertonicity noted.  Biomechanically joint dysfunction C5-C6 and extension left lateral bending right rotation.    Parathoracic hypertonicity is noted pelvic obliquity elevated right versus left innominate leg with any quality biomechanical joint dysfunction present in the right SI and L5-S1 motion unit.  Parathoracic hypertonicity noted active triggers paraspinally.  Joint dysfunction T4-T5.  Assessment:   Diagnosis ICD-10-CM Associated Orders   1. Neck pain  M54.2       2. Myofascial pain  M79.18       3. Acute bilateral low back pain without sciatica  M54.50       4. Acute bilateral thoracic back pain  M54.6                 Treatment: 20248  Manipulation right SI L5-S1 via Andino drop maneuver well-tolerated.  Manipulation T4 producing good joint release.  Myofascial release paracervical spine with manipulation C5-C6 also well-tolerated    Discussion:  Reviewed home care instructions she will work with these I will see her back for follow-up .

## 2025-01-02 ENCOUNTER — OFFICE VISIT (OUTPATIENT)
Age: 55
End: 2025-01-02
Payer: COMMERCIAL

## 2025-01-02 VITALS
DIASTOLIC BLOOD PRESSURE: 88 MMHG | SYSTOLIC BLOOD PRESSURE: 124 MMHG | HEIGHT: 65 IN | BODY MASS INDEX: 30.49 KG/M2 | WEIGHT: 183 LBS | HEART RATE: 87 BPM

## 2025-01-02 DIAGNOSIS — M54.50 ACUTE BILATERAL LOW BACK PAIN WITHOUT SCIATICA: ICD-10-CM

## 2025-01-02 DIAGNOSIS — M54.2 NECK PAIN: Primary | ICD-10-CM

## 2025-01-02 DIAGNOSIS — M54.6 ACUTE BILATERAL THORACIC BACK PAIN: ICD-10-CM

## 2025-01-02 DIAGNOSIS — M79.18 MYOFASCIAL PAIN: ICD-10-CM

## 2025-01-02 PROCEDURE — 98942 CHIROPRACTIC MANJ 5 REGIONS: CPT | Performed by: CHIROPRACTOR

## 2025-01-02 RX ORDER — ALPRAZOLAM 0.25 MG/1
0.25 TABLET ORAL ONCE
COMMUNITY
Start: 2024-12-12

## 2025-01-02 NOTE — PROGRESS NOTES
HPI:  Rachel returns for treatment reports tightness in the neck left-sided, low back pain 2-3 on a pain scale.      The following portions of the patient's history were reviewed and updated as appropriate: allergies, current medications, past family history, past medical history, past social history, past surgical history, and problem list.    Review of Systems    Physical Exam:    Exam reveals significant improvement in regards to overall cervical range of motion with increases in all planes of movement she still does have some left-sided paracervical hypertonicity noted.  Biomechanically joint dysfunction C5-C6 and extension left lateral bending right rotation.    Parathoracic hypertonicity is noted pelvic obliquity elevated right versus left innominate leg with any quality biomechanical joint dysfunction present in the right SI and L5-S1 motion unit.  Parathoracic hypertonicity noted active triggers paraspinally.  Joint dysfunction T4-T5.  Assessment:   Diagnosis ICD-10-CM Associated Orders   1. Neck pain  M54.2       2. Myofascial pain  M79.18       3. Acute bilateral low back pain without sciatica  M54.50       4. Acute bilateral thoracic back pain  M54.6                 Treatment: 08156  Manipulation right SI L5-S1 via Andino drop maneuver well-tolerated.  Manipulation T4 producing good joint release.  Myofascial release paracervical spine with manipulation C5-C6 also well-tolerated    Discussion:  Reviewed home care instructions she will work with these I will see her back for follow-up .

## 2025-01-30 ENCOUNTER — PROCEDURE VISIT (OUTPATIENT)
Age: 55
End: 2025-01-30
Payer: COMMERCIAL

## 2025-01-30 VITALS
SYSTOLIC BLOOD PRESSURE: 120 MMHG | DIASTOLIC BLOOD PRESSURE: 77 MMHG | HEART RATE: 79 BPM | WEIGHT: 183 LBS | HEIGHT: 65 IN | BODY MASS INDEX: 30.49 KG/M2

## 2025-01-30 DIAGNOSIS — M54.6 ACUTE BILATERAL THORACIC BACK PAIN: ICD-10-CM

## 2025-01-30 DIAGNOSIS — M54.50 ACUTE BILATERAL LOW BACK PAIN WITHOUT SCIATICA: ICD-10-CM

## 2025-01-30 DIAGNOSIS — M54.2 NECK PAIN: Primary | ICD-10-CM

## 2025-01-30 DIAGNOSIS — M79.18 MYOFASCIAL PAIN: ICD-10-CM

## 2025-01-30 PROCEDURE — 98942 CHIROPRACTIC MANJ 5 REGIONS: CPT | Performed by: CHIROPRACTOR

## 2025-01-30 NOTE — PROGRESS NOTES
HPI:  Rachel returns for treatment reports tightness in the neck left-sided, low back pain 2 on a pain scale.      The following portions of the patient's history were reviewed and updated as appropriate: allergies, current medications, past family history, past medical history, past social history, past surgical history, and problem list.    Review of Systems    Physical Exam:    Exam reveals significant improvement in regards to overall cervical range of motion with increases in all planes of movement she still does have some left-sided paracervical hypertonicity noted.  Biomechanically joint dysfunction C5-C6 and extension left lateral bending right rotation.    Parathoracic hypertonicity is noted pelvic obliquity elevated right versus left innominate leg with any quality biomechanical joint dysfunction present in the right SI and L5-S1 motion unit.  Parathoracic hypertonicity noted active triggers paraspinally.  Joint dysfunction T4-T5.    Assessment:   Diagnosis ICD-10-CM Associated Orders   1. Neck pain  M54.2       2. Myofascial pain  M79.18       3. Acute bilateral low back pain without sciatica  M54.50       4. Acute bilateral thoracic back pain  M54.6                 Treatment: 94307  Manipulation right SI L5-S1 via Andino drop maneuver well-tolerated.  Manipulation T4 producing good joint release.  Myofascial release paracervical spine with manipulation C5-C6 also well-tolerated    Discussion:  Reviewed home care instructions she will work with these I will see her back for follow-up .

## 2025-02-27 ENCOUNTER — PROCEDURE VISIT (OUTPATIENT)
Age: 55
End: 2025-02-27
Payer: COMMERCIAL

## 2025-02-27 VITALS
HEIGHT: 65 IN | WEIGHT: 183 LBS | DIASTOLIC BLOOD PRESSURE: 86 MMHG | HEART RATE: 87 BPM | BODY MASS INDEX: 30.49 KG/M2 | SYSTOLIC BLOOD PRESSURE: 124 MMHG

## 2025-02-27 DIAGNOSIS — M54.6 ACUTE BILATERAL THORACIC BACK PAIN: ICD-10-CM

## 2025-02-27 DIAGNOSIS — M79.18 MYOFASCIAL PAIN: ICD-10-CM

## 2025-02-27 DIAGNOSIS — M54.50 ACUTE BILATERAL LOW BACK PAIN WITHOUT SCIATICA: Primary | ICD-10-CM

## 2025-02-27 PROCEDURE — 98941 CHIROPRACT MANJ 3-4 REGIONS: CPT | Performed by: CHIROPRACTOR

## 2025-02-27 RX ORDER — UBROGEPANT 100 MG/1
100 TABLET ORAL SEE ADMIN INSTRUCTIONS
COMMUNITY
Start: 2025-02-12

## 2025-03-02 NOTE — PROGRESS NOTES
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  Neck has been well.  Pain a 3-4 on a pain scale      The following portions of the patient's history were reviewed and updated as appropriate: allergies, current medications, past family history, past medical history, past social history, past surgical history, and problem list.    Review of Systems    Physical Exam:    Parathoracic hypertonicity is noted pelvic obliquity elevated right versus left innominate leg with any quality biomechanical joint dysfunction present in the right SI and L5-S1 motion unit.  Parathoracic hypertonicity noted active triggers paraspinally.  Joint dysfunction T4-T5.    Assessment:   Diagnosis ICD-10-CM Associated Orders   1. Acute bilateral low back pain without sciatica  M54.50       2. Acute bilateral thoracic back pain  M54.6       3. Myofascial pain  M79.18                 Treatment: 55993  Manipulation right SI L5-S1 via Andino drop maneuver well-tolerated.  Manipulation T4 producing good joint release.      Discussion:  Reviewed home care instructions she will work with these I will see her back for follow-up .

## 2025-03-19 ENCOUNTER — PROCEDURE VISIT (OUTPATIENT)
Age: 55
End: 2025-03-19
Payer: COMMERCIAL

## 2025-03-19 VITALS
BODY MASS INDEX: 30.49 KG/M2 | HEIGHT: 65 IN | DIASTOLIC BLOOD PRESSURE: 89 MMHG | WEIGHT: 183 LBS | SYSTOLIC BLOOD PRESSURE: 138 MMHG | HEART RATE: 87 BPM

## 2025-03-19 DIAGNOSIS — M79.18 MYOFASCIAL PAIN: ICD-10-CM

## 2025-03-19 DIAGNOSIS — M54.50 ACUTE BILATERAL LOW BACK PAIN WITHOUT SCIATICA: Primary | ICD-10-CM

## 2025-03-19 DIAGNOSIS — M54.6 ACUTE BILATERAL THORACIC BACK PAIN: ICD-10-CM

## 2025-03-19 DIAGNOSIS — M54.2 NECK PAIN: ICD-10-CM

## 2025-03-19 PROCEDURE — 98941 CHIROPRACT MANJ 3-4 REGIONS: CPT | Performed by: CHIROPRACTOR

## 2025-03-19 NOTE — PROGRESS NOTES
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  Neck has been well.  Pain a 2-5 on a pain scale      The following portions of the patient's history were reviewed and updated as appropriate: allergies, current medications, past family history, past medical history, past social history, past surgical history, and problem list.    Review of Systems    Physical Exam:    Parathoracic hypertonicity is noted pelvic obliquity elevated right versus left innominate leg with any quality biomechanical joint dysfunction present in the right SI and L5-S1 motion unit.  Parathoracic hypertonicity noted active triggers paraspinally.  Joint dysfunction T4-T5.    Assessment:   Diagnosis ICD-10-CM Associated Orders   1. Acute bilateral low back pain without sciatica  M54.50       2. Acute bilateral thoracic back pain  M54.6       3. Myofascial pain  M79.18       4. Neck pain  M54.2                 Treatment: 62071  Manipulation right SI L5-S1 via Andino drop maneuver well-tolerated.  Manipulation T4 producing good joint release.      Discussion:  Reviewed home care instructions she will work with these I will see her back for follow-up .

## 2025-04-16 ENCOUNTER — PROCEDURE VISIT (OUTPATIENT)
Age: 55
End: 2025-04-16
Payer: COMMERCIAL

## 2025-04-16 VITALS
BODY MASS INDEX: 30.49 KG/M2 | SYSTOLIC BLOOD PRESSURE: 120 MMHG | HEART RATE: 83 BPM | HEIGHT: 65 IN | DIASTOLIC BLOOD PRESSURE: 84 MMHG | WEIGHT: 183 LBS

## 2025-04-16 DIAGNOSIS — M54.6 ACUTE BILATERAL THORACIC BACK PAIN: ICD-10-CM

## 2025-04-16 DIAGNOSIS — M79.18 MYOFASCIAL PAIN: ICD-10-CM

## 2025-04-16 DIAGNOSIS — M54.2 NECK PAIN: ICD-10-CM

## 2025-04-16 DIAGNOSIS — M54.50 ACUTE BILATERAL LOW BACK PAIN WITHOUT SCIATICA: Primary | ICD-10-CM

## 2025-04-16 PROCEDURE — 98942 CHIROPRACTIC MANJ 5 REGIONS: CPT | Performed by: CHIROPRACTOR

## 2025-04-16 NOTE — PROGRESS NOTES
HPI:  Rachel returns for treatment reports low back pain tightness in the mornings.  Neck has been well.  Pain a 2 on a pain scale      The following portions of the patient's history were reviewed and updated as appropriate: allergies, current medications, past family history, past medical history, past social history, past surgical history, and problem list.    Review of Systems    Physical Exam:    Parathoracic hypertonicity is noted pelvic obliquity elevated right versus left innominate leg with any quality biomechanical joint dysfunction present in the right SI and L5-S1 motion unit.  Parathoracic hypertonicity noted active triggers paraspinally.  Joint dysfunction T4-T5.  C5-C6 left lateral bending.    Assessment:   Diagnosis ICD-10-CM Associated Orders   1. Acute bilateral low back pain without sciatica  M54.50       2. Acute bilateral thoracic back pain  M54.6       3. Myofascial pain  M79.18       4. Neck pain  M54.2                 Treatment: 17629  Manipulation right SI L5-S1 via Andino drop maneuver well-tolerated.  Manipulation T4, C5 producing good joint release.      Discussion:  Reviewed home care instructions she will work with these I will see her back for follow-up .

## 2025-05-16 ENCOUNTER — PROCEDURE VISIT (OUTPATIENT)
Age: 55
End: 2025-05-16
Payer: COMMERCIAL

## 2025-05-16 VITALS
HEIGHT: 65 IN | SYSTOLIC BLOOD PRESSURE: 118 MMHG | HEART RATE: 87 BPM | BODY MASS INDEX: 30.49 KG/M2 | WEIGHT: 183 LBS | DIASTOLIC BLOOD PRESSURE: 87 MMHG

## 2025-05-16 DIAGNOSIS — M54.6 ACUTE BILATERAL THORACIC BACK PAIN: ICD-10-CM

## 2025-05-16 DIAGNOSIS — M79.18 MYOFASCIAL PAIN: ICD-10-CM

## 2025-05-16 DIAGNOSIS — M54.50 ACUTE BILATERAL LOW BACK PAIN WITHOUT SCIATICA: Primary | ICD-10-CM

## 2025-05-16 DIAGNOSIS — M54.2 NECK PAIN: ICD-10-CM

## 2025-05-16 PROCEDURE — 98942 CHIROPRACTIC MANJ 5 REGIONS: CPT | Performed by: CHIROPRACTOR

## 2025-05-27 NOTE — PROGRESS NOTES
HPI:  Rachel returns for treatment reports low back pain tightness in the mornings.  Neck neck pain.  Pain a 7 on a pain scale      The following portions of the patient's history were reviewed and updated as appropriate: allergies, current medications, past family history, past medical history, past social history, past surgical history, and problem list.    Review of Systems    Physical Exam:    Parathoracic hypertonicity is noted pelvic obliquity elevated right versus left innominate leg with any quality biomechanical joint dysfunction present in the right SI and L5-S1 motion unit.  Parathoracic hypertonicity noted active triggers paraspinally.  Joint dysfunction T4-T5.  C5-C6 left lateral bending.    Assessment:   Diagnosis ICD-10-CM Associated Orders   1. Acute bilateral low back pain without sciatica  M54.50       2. Acute bilateral thoracic back pain  M54.6       3. Myofascial pain  M79.18       4. Neck pain  M54.2                 Treatment: 07195  Manipulation right SI L5-S1 via Andino drop maneuver well-tolerated.  Manipulation T4, C5 producing good joint release.      Discussion:  Reviewed home care instructions she will work with these I will see her back for follow-up .

## 2025-06-02 ENCOUNTER — OFFICE VISIT (OUTPATIENT)
Age: 55
End: 2025-06-02
Payer: COMMERCIAL

## 2025-06-02 VITALS
HEIGHT: 65 IN | WEIGHT: 183 LBS | SYSTOLIC BLOOD PRESSURE: 129 MMHG | BODY MASS INDEX: 30.49 KG/M2 | DIASTOLIC BLOOD PRESSURE: 85 MMHG

## 2025-06-02 DIAGNOSIS — M54.50 ACUTE LEFT-SIDED LOW BACK PAIN WITHOUT SCIATICA: Primary | ICD-10-CM

## 2025-06-02 DIAGNOSIS — M79.18 MYOFASCIAL PAIN: ICD-10-CM

## 2025-06-02 PROCEDURE — 98941 CHIROPRACT MANJ 3-4 REGIONS: CPT | Performed by: CHIROPRACTOR

## 2025-06-04 ENCOUNTER — PROCEDURE VISIT (OUTPATIENT)
Age: 55
End: 2025-06-04
Payer: COMMERCIAL

## 2025-06-04 VITALS
WEIGHT: 183 LBS | SYSTOLIC BLOOD PRESSURE: 128 MMHG | BODY MASS INDEX: 30.49 KG/M2 | HEIGHT: 65 IN | DIASTOLIC BLOOD PRESSURE: 76 MMHG

## 2025-06-04 DIAGNOSIS — M54.50 ACUTE LEFT-SIDED LOW BACK PAIN WITHOUT SCIATICA: Primary | ICD-10-CM

## 2025-06-04 DIAGNOSIS — M79.18 MYOFASCIAL PAIN: ICD-10-CM

## 2025-06-04 PROCEDURE — 98941 CHIROPRACT MANJ 3-4 REGIONS: CPT | Performed by: CHIROPRACTOR

## 2025-06-04 NOTE — PROGRESS NOTES
Assessment:   Diagnosis ICD-10-CM Associated Orders   1. Acute left-sided low back pain without sciatica  M54.50       2. Myofascial pain  M79.18                 Treatment: 40077  Manipulation right SI L5-S1 via Andino drop maneuver well-tolerated.  Manipulation T4.      Discussion:  Reviewed home care instructions she will work with these I will see her back for follow-up .    HPI:  Rachel returns for treatment reports low back pain tightness in the mornings.  Low back pain left-sided in nature 4 on a pain scale.  Definitely notes improvement today.    The following portions of the patient's history were reviewed and updated as appropriate: allergies, current medications, past family history, past medical history, past social history, past surgical history, and problem list.    Review of Systems    Physical Exam:    Parathoracic hypertonicity is noted pelvic obliquity elevated right versus left innominate leg with any quality biomechanical joint dysfunction present in the right SI and L5-S1 motion unit.  Parathoracic hypertonicity noted active triggers paraspinally.  Joint dysfunction T4-T5.

## 2025-06-08 NOTE — PROGRESS NOTES
Assessment:   Diagnosis ICD-10-CM Associated Orders   1. Acute left-sided low back pain without sciatica  M54.50       2. Myofascial pain  M79.18                 Treatment: 78345  Manipulation right SI L5-S1 via Andino drop maneuver well-tolerated.  Manipulation T4, C5 producing good joint release.      Discussion:  Reviewed home care instructions she will work with these I will see her back for follow-up .    HPI:  Acute exacerbation of pain which began on Saturday and 9 on a pain scale today is slightly better but is still a 5.  Difficulty getting up out of a seated position bending twisting and turning.  Pain is localized to left parasacral region without distal referral denies any numbness or tingling in the lower extremity denies any weakness reports no yellow flag symptoms.      The following portions of the patient's history were reviewed and updated as appropriate: allergies, current medications, past family history, past medical history, past social history, past surgical history, and problem list.    Review of Systems    Physical Exam:    Exam reveals Viktoria to be in some discomfort.  Arises slowly out of seated position.  Visible pelvic obliquity.  Leg length difference noted.  Biomechanically reduced range of motion flexion extension left lateral bending.  Paralumbar hypertonicity noted spasm of the left erector spinae quadratus lumborum and piriformis musculature.  Joint dysfunction noted of the left SI L5-S1 motion unit.  Neurologically stable without evidence of focal deficit lower extremity.

## 2025-06-18 ENCOUNTER — PROCEDURE VISIT (OUTPATIENT)
Age: 55
End: 2025-06-18
Payer: COMMERCIAL

## 2025-06-18 VITALS
SYSTOLIC BLOOD PRESSURE: 117 MMHG | HEART RATE: 86 BPM | BODY MASS INDEX: 30.49 KG/M2 | DIASTOLIC BLOOD PRESSURE: 87 MMHG | HEIGHT: 65 IN | WEIGHT: 183 LBS

## 2025-06-18 DIAGNOSIS — M54.50 ACUTE LEFT-SIDED LOW BACK PAIN WITHOUT SCIATICA: Primary | ICD-10-CM

## 2025-06-18 DIAGNOSIS — M54.2 NECK PAIN: ICD-10-CM

## 2025-06-18 DIAGNOSIS — M79.18 MYOFASCIAL PAIN: ICD-10-CM

## 2025-06-18 PROCEDURE — 98941 CHIROPRACT MANJ 3-4 REGIONS: CPT | Performed by: CHIROPRACTOR

## 2025-06-20 NOTE — PROGRESS NOTES
Assessment:   Diagnosis ICD-10-CM Associated Orders   1. Acute left-sided low back pain without sciatica  M54.50       2. Myofascial pain  M79.18       3. Neck pain  M54.2                 Treatment: 98142  Manipulation right SI L5-S1 via Andino drop maneuver well-tolerated.  Manipulation T4.      Discussion:  Reviewed home care instructions she will work with these I will see her back for follow-up .    HPI:  Rachel returns for treatment reports low back pain tightness in the mornings.  Low back pain left-sided in nature 1 on a pain scale.  Definitely notes improvement today.    The following portions of the patient's history were reviewed and updated as appropriate: allergies, current medications, past family history, past medical history, past social history, past surgical history, and problem list.    Review of Systems    Physical Exam:    Parathoracic hypertonicity is noted pelvic obliquity elevated right versus left innominate leg with any quality biomechanical joint dysfunction present in the right SI and L5-S1 motion unit.  Parathoracic hypertonicity noted active triggers paraspinally.  Joint dysfunction T4-T5.

## 2025-07-21 ENCOUNTER — PROCEDURE VISIT (OUTPATIENT)
Age: 55
End: 2025-07-21
Payer: COMMERCIAL

## 2025-07-21 VITALS
DIASTOLIC BLOOD PRESSURE: 88 MMHG | HEIGHT: 65 IN | BODY MASS INDEX: 30.49 KG/M2 | WEIGHT: 183 LBS | SYSTOLIC BLOOD PRESSURE: 123 MMHG

## 2025-07-21 DIAGNOSIS — M54.2 NECK PAIN: ICD-10-CM

## 2025-07-21 DIAGNOSIS — M54.50 ACUTE LEFT-SIDED LOW BACK PAIN WITHOUT SCIATICA: Primary | ICD-10-CM

## 2025-07-21 DIAGNOSIS — M79.18 MYOFASCIAL PAIN: ICD-10-CM

## 2025-07-21 PROCEDURE — 98941 CHIROPRACT MANJ 3-4 REGIONS: CPT | Performed by: CHIROPRACTOR

## 2025-07-21 NOTE — PROGRESS NOTES
Assessment:   Diagnosis ICD-10-CM Associated Orders   1. Acute left-sided low back pain without sciatica  M54.50       2. Myofascial pain  M79.18       3. Neck pain  M54.2                 Treatment: 60795  Manipulation right SI L5-S1 via Andino drop maneuver well-tolerated.  Manipulation T4.  Manipulation C5    Discussion:  Reviewed home care instructions she will work with these I will see her back for follow-up .    HPI:  Rachel returns for treatment reports low back pain tightness in the mornings.  Low back pain left-sided in nature 2 on a pain scale.      The following portions of the patient's history were reviewed and updated as appropriate: allergies, current medications, past family history, past medical history, past social history, past surgical history, and problem list.    Review of Systems    Physical Exam:    Parathoracic hypertonicity is noted pelvic obliquity elevated right versus left innominate leg with any quality biomechanical joint dysfunction present in the right SI and L5-S1 motion unit.  Parathoracic hypertonicity noted active triggers paraspinally.  Joint dysfunction T4-T5.    Decreased cervical right lateral bending and extension joint dysfunction C5-C6

## 2025-08-18 ENCOUNTER — PROCEDURE VISIT (OUTPATIENT)
Age: 55
End: 2025-08-18
Payer: COMMERCIAL

## 2025-08-18 VITALS
DIASTOLIC BLOOD PRESSURE: 82 MMHG | WEIGHT: 183 LBS | TEMPERATURE: 83 F | SYSTOLIC BLOOD PRESSURE: 135 MMHG | HEIGHT: 65 IN | BODY MASS INDEX: 30.49 KG/M2

## 2025-08-18 DIAGNOSIS — M54.59 MECHANICAL LOW BACK PAIN: Primary | ICD-10-CM

## 2025-08-18 DIAGNOSIS — M54.2 NECK PAIN: ICD-10-CM

## 2025-08-18 DIAGNOSIS — M79.18 MYOFASCIAL PAIN: ICD-10-CM

## 2025-08-18 PROCEDURE — 98941 CHIROPRACT MANJ 3-4 REGIONS: CPT | Performed by: CHIROPRACTOR
